# Patient Record
Sex: FEMALE | Race: WHITE | NOT HISPANIC OR LATINO | Employment: FULL TIME | URBAN - METROPOLITAN AREA
[De-identification: names, ages, dates, MRNs, and addresses within clinical notes are randomized per-mention and may not be internally consistent; named-entity substitution may affect disease eponyms.]

---

## 2023-03-09 ENCOUNTER — TELEPHONE (OUTPATIENT)
Dept: PRIMARY CARE | Facility: CLINIC | Age: 58
End: 2023-03-09

## 2023-03-09 DIAGNOSIS — F90.0 ATTENTION DEFICIT HYPERACTIVITY DISORDER (ADHD), PREDOMINANTLY INATTENTIVE TYPE: Primary | ICD-10-CM

## 2023-03-09 DIAGNOSIS — F41.9 ANXIETY: Primary | ICD-10-CM

## 2023-03-09 RX ORDER — LISDEXAMFETAMINE DIMESYLATE 30 MG/1
30 CAPSULE ORAL EVERY MORNING
Qty: 30 CAPSULE | Refills: 0 | Status: SHIPPED | OUTPATIENT
Start: 2023-03-09 | End: 2023-04-14 | Stop reason: SDUPTHER

## 2023-03-09 RX ORDER — ALPRAZOLAM 0.5 MG/1
0.5 TABLET ORAL 2 TIMES DAILY
COMMUNITY
End: 2023-03-09 | Stop reason: SDUPTHER

## 2023-03-09 RX ORDER — ALPRAZOLAM 0.5 MG/1
0.5 TABLET ORAL NIGHTLY PRN
Qty: 30 TABLET | Refills: 0 | Status: SHIPPED | OUTPATIENT
Start: 2023-03-09 | End: 2023-03-21 | Stop reason: SDUPTHER

## 2023-03-09 RX ORDER — CHLORHEXIDINE GLUCONATE ORAL RINSE 1.2 MG/ML
SOLUTION DENTAL
COMMUNITY
Start: 2022-11-02

## 2023-03-09 RX ORDER — ASPIRIN 325 MG
1 TABLET, DELAYED RELEASE (ENTERIC COATED) ORAL
COMMUNITY
Start: 2022-01-12 | End: 2024-03-06 | Stop reason: SDUPTHER

## 2023-03-10 PROBLEM — H90.11 CONDUCTIVE HEARING LOSS IN RIGHT EAR: Status: ACTIVE | Noted: 2023-03-10

## 2023-03-10 PROBLEM — B02.23 SHINGLES (HERPES ZOSTER) POLYNEUROPATHY: Status: ACTIVE | Noted: 2023-03-10

## 2023-03-10 PROBLEM — K21.9 GERD (GASTROESOPHAGEAL REFLUX DISEASE): Status: ACTIVE | Noted: 2023-03-10

## 2023-03-10 PROBLEM — F98.8 ADD (ATTENTION DEFICIT DISORDER): Status: ACTIVE | Noted: 2023-03-10

## 2023-03-10 PROBLEM — R25.2 MUSCLE CRAMPS: Status: ACTIVE | Noted: 2023-03-10

## 2023-03-10 PROBLEM — H92.01 OTALGIA OF RIGHT EAR: Status: ACTIVE | Noted: 2023-03-10

## 2023-03-10 PROBLEM — R07.81 RIB PAIN ON LEFT SIDE: Status: ACTIVE | Noted: 2023-03-10

## 2023-03-10 PROBLEM — H92.09 EARACHE: Status: ACTIVE | Noted: 2023-03-10

## 2023-03-10 PROBLEM — J45.909 ASTHMATIC BRONCHITIS (HHS-HCC): Status: ACTIVE | Noted: 2023-03-10

## 2023-03-10 PROBLEM — N39.0 URINARY TRACT INFECTION: Status: ACTIVE | Noted: 2023-03-10

## 2023-03-10 PROBLEM — F32.A ANXIETY AND DEPRESSION: Status: ACTIVE | Noted: 2023-03-10

## 2023-03-10 PROBLEM — M21.42 ACQUIRED PES PLANUS OF LEFT FOOT: Status: ACTIVE | Noted: 2023-03-10

## 2023-03-10 PROBLEM — R07.89 ATYPICAL CHEST PAIN: Status: ACTIVE | Noted: 2023-03-10

## 2023-03-10 PROBLEM — R09.82 POST-NASAL DRIP: Status: ACTIVE | Noted: 2023-03-10

## 2023-03-10 PROBLEM — M81.0 OSTEOPOROSIS: Status: ACTIVE | Noted: 2023-03-10

## 2023-03-10 PROBLEM — H90.71: Status: ACTIVE | Noted: 2023-03-10

## 2023-03-10 PROBLEM — E78.00 HYPERCHOLESTEROLEMIA: Status: ACTIVE | Noted: 2023-03-10

## 2023-03-10 PROBLEM — H90.11 CONDUCTIVE HEARING LOSS OF RIGHT EAR WITH UNRESTRICTED HEARING OF LEFT EAR: Status: ACTIVE | Noted: 2023-03-10

## 2023-03-10 PROBLEM — E78.5 DYSLIPIDEMIA: Status: ACTIVE | Noted: 2023-03-10

## 2023-03-10 PROBLEM — H90.71 MIXED CONDUCTIVE AND SENSORINEURAL HEARING LOSS OF RIGHT EAR WITH UNRESTRICTED HEARING OF LEFT EAR: Status: ACTIVE | Noted: 2023-03-10

## 2023-03-10 PROBLEM — E04.1 THYROID NODULE: Status: ACTIVE | Noted: 2023-03-10

## 2023-03-10 PROBLEM — R53.83 TIREDNESS: Status: ACTIVE | Noted: 2023-03-10

## 2023-03-10 PROBLEM — M79.7 MUSCLE PAIN, FIBROMYALGIA: Status: ACTIVE | Noted: 2023-03-10

## 2023-03-10 PROBLEM — F17.200 NICOTINE DEPENDENCE: Status: ACTIVE | Noted: 2023-03-10

## 2023-03-10 PROBLEM — H92.03 OTALGIA OF BOTH EARS: Status: ACTIVE | Noted: 2023-03-10

## 2023-03-10 PROBLEM — H90.2 HEARING LOSS, CONDUCTIVE: Status: ACTIVE | Noted: 2023-03-10

## 2023-03-10 PROBLEM — R06.02 SHORTNESS OF BREATH: Status: ACTIVE | Noted: 2023-03-10

## 2023-03-10 PROBLEM — R05.8 RECURRENT DRY COUGH: Status: ACTIVE | Noted: 2023-03-10

## 2023-03-10 PROBLEM — E04.2 MULTIPLE THYROID NODULES: Status: ACTIVE | Noted: 2023-03-10

## 2023-03-10 PROBLEM — M77.42 METATARSALGIA OF LEFT FOOT: Status: ACTIVE | Noted: 2023-03-10

## 2023-03-10 PROBLEM — M26.69 TMJ CREPITUS: Status: ACTIVE | Noted: 2023-03-10

## 2023-03-10 PROBLEM — B86 SCABIES INFESTATION: Status: ACTIVE | Noted: 2023-03-10

## 2023-03-10 PROBLEM — T78.40XA ALLERGY: Status: ACTIVE | Noted: 2023-03-10

## 2023-03-10 PROBLEM — R07.0 THROAT PAIN: Status: ACTIVE | Noted: 2023-03-10

## 2023-03-10 PROBLEM — R79.89 LOW TSH LEVEL: Status: ACTIVE | Noted: 2023-03-10

## 2023-03-10 PROBLEM — M25.471 EDEMA OF RIGHT ANKLE: Status: ACTIVE | Noted: 2023-03-10

## 2023-03-10 PROBLEM — J34.2 DEVIATED NASAL SEPTUM: Status: ACTIVE | Noted: 2023-03-10

## 2023-03-10 PROBLEM — R10.9 ABDOMINAL PAIN: Status: ACTIVE | Noted: 2023-03-10

## 2023-03-10 PROBLEM — J34.89 NASAL VESTIBULITIS: Status: ACTIVE | Noted: 2023-03-10

## 2023-03-10 PROBLEM — F41.9 ANXIETY AND DEPRESSION: Status: ACTIVE | Noted: 2023-03-10

## 2023-03-10 PROBLEM — E55.9 VITAMIN D DEFICIENCY: Status: ACTIVE | Noted: 2023-03-10

## 2023-03-10 PROBLEM — R05.3 PERSISTENT COUGH: Status: ACTIVE | Noted: 2023-03-10

## 2023-03-10 PROBLEM — A49.02 MRSA INFECTION (METHICILLIN-RESISTANT STAPHYLOCOCCUS AUREUS): Status: ACTIVE | Noted: 2023-03-10

## 2023-03-10 PROBLEM — R05.8: Status: ACTIVE | Noted: 2023-03-10

## 2023-03-10 PROBLEM — H02.60 XANTHELASMA: Status: ACTIVE | Noted: 2023-03-10

## 2023-03-10 PROBLEM — F11.20 OPIOID DEPENDENCE (MULTI): Status: ACTIVE | Noted: 2023-03-10

## 2023-03-10 PROBLEM — R05.9 COUGH: Status: ACTIVE | Noted: 2023-03-10

## 2023-03-10 PROBLEM — F41.8 ANTICIPATORY ANXIETY: Status: ACTIVE | Noted: 2023-03-10

## 2023-03-10 PROBLEM — R44.8 FACIAL PRESSURE: Status: ACTIVE | Noted: 2023-03-10

## 2023-03-10 PROBLEM — M54.50 LOW BACK PAIN: Status: ACTIVE | Noted: 2023-03-10

## 2023-03-10 PROBLEM — H73.10: Status: ACTIVE | Noted: 2023-03-10

## 2023-03-10 PROBLEM — F41.9 ANXIETY: Status: ACTIVE | Noted: 2023-03-10

## 2023-03-10 PROBLEM — L30.9 DERMATITIS: Status: ACTIVE | Noted: 2023-03-10

## 2023-03-10 PROBLEM — M84.375A METATARSAL STRESS FRACTURE OF LEFT FOOT: Status: ACTIVE | Noted: 2023-03-10

## 2023-03-10 PROBLEM — J30.2 SEASONAL ALLERGIES: Status: ACTIVE | Noted: 2023-03-10

## 2023-03-10 PROBLEM — R09.81 SINUS CONGESTION: Status: ACTIVE | Noted: 2023-03-10

## 2023-03-10 PROBLEM — H61.23 IMPACTED CERUMEN OF BOTH EARS: Status: ACTIVE | Noted: 2023-03-10

## 2023-03-10 PROBLEM — H61.21 IMPACTED CERUMEN OF RIGHT EAR: Status: ACTIVE | Noted: 2023-03-10

## 2023-03-10 PROBLEM — M84.375A STRESS FRACTURE OF TARSAL BONE OF LEFT FOOT: Status: ACTIVE | Noted: 2023-03-10

## 2023-03-10 PROBLEM — B34.9 VIRAL ILLNESS: Status: ACTIVE | Noted: 2023-03-10

## 2023-03-10 PROBLEM — R19.7 DIARRHEA: Status: ACTIVE | Noted: 2023-03-10

## 2023-03-10 PROBLEM — B37.31 VAGINAL YEAST INFECTION: Status: ACTIVE | Noted: 2023-03-10

## 2023-03-10 RX ORDER — EVOLOCUMAB 140 MG/ML
140 INJECTION, SOLUTION SUBCUTANEOUS
COMMUNITY

## 2023-03-10 RX ORDER — ALPRAZOLAM 0.5 MG/1
1 TABLET ORAL 2 TIMES DAILY
COMMUNITY
Start: 2022-09-08 | End: 2023-04-11 | Stop reason: ALTCHOICE

## 2023-03-10 RX ORDER — LISDEXAMFETAMINE DIMESYLATE CAPSULES 20 MG/1
1 CAPSULE ORAL DAILY
COMMUNITY
End: 2023-06-14 | Stop reason: ALTCHOICE

## 2023-03-10 RX ORDER — ALIROCUMAB 150 MG/ML
150 INJECTION, SOLUTION SUBCUTANEOUS
COMMUNITY
Start: 2020-03-19

## 2023-03-17 DIAGNOSIS — N63.32 MASS OF AXILLARY TAIL OF LEFT BREAST: Primary | ICD-10-CM

## 2023-03-17 PROBLEM — M60.9 STATIN-INDUCED MYOSITIS: Status: ACTIVE | Noted: 2023-03-17

## 2023-03-17 PROBLEM — M47.817 SPONDYLOSIS, LUMBOSACRAL: Status: ACTIVE | Noted: 2023-03-17

## 2023-03-17 PROBLEM — T46.6X5A STATIN-INDUCED MYOSITIS: Status: ACTIVE | Noted: 2023-03-17

## 2023-03-20 ENCOUNTER — APPOINTMENT (OUTPATIENT)
Dept: PRIMARY CARE | Facility: CLINIC | Age: 58
End: 2023-03-20

## 2023-03-21 ENCOUNTER — TELEPHONE (OUTPATIENT)
Dept: PRIMARY CARE | Facility: CLINIC | Age: 58
End: 2023-03-21

## 2023-03-21 DIAGNOSIS — F41.9 ANXIETY: ICD-10-CM

## 2023-03-21 RX ORDER — ALPRAZOLAM 0.5 MG/1
0.5 TABLET ORAL 2 TIMES DAILY
Qty: 30 TABLET | Refills: 0 | Status: SHIPPED | OUTPATIENT
Start: 2023-03-21 | End: 2023-04-11 | Stop reason: ALTCHOICE

## 2023-03-21 NOTE — TELEPHONE ENCOUNTER
Pt state her alprazolam was called into [facility] pharmacy wrong, she states she usually get alprazolam 0.5 take 1 tablet twice daily, she stae she is running out of her medications, please refill

## 2023-04-05 ENCOUNTER — TELEPHONE (OUTPATIENT)
Dept: PRIMARY CARE | Facility: CLINIC | Age: 58
End: 2023-04-05

## 2023-04-05 NOTE — TELEPHONE ENCOUNTER
----- Message from Sariah Sandoval PA-C sent at 4/4/2023  3:26 PM EDT -----  Please call her with mammogram results everything is normal.  No mass found.

## 2023-05-10 DIAGNOSIS — F41.9 ANXIETY: ICD-10-CM

## 2023-05-10 RX ORDER — ALPRAZOLAM 0.5 MG/1
0.5 TABLET ORAL 2 TIMES DAILY
Qty: 60 TABLET | Refills: 2 | Status: SHIPPED | OUTPATIENT
Start: 2023-05-10 | End: 2023-08-16 | Stop reason: SDUPTHER

## 2023-05-17 DIAGNOSIS — F90.0 ATTENTION DEFICIT HYPERACTIVITY DISORDER (ADHD), PREDOMINANTLY INATTENTIVE TYPE: ICD-10-CM

## 2023-05-17 RX ORDER — LISDEXAMFETAMINE DIMESYLATE 30 MG/1
30 CAPSULE ORAL EVERY MORNING
Qty: 30 CAPSULE | Refills: 0 | Status: SHIPPED | OUTPATIENT
Start: 2023-05-17 | End: 2023-06-14 | Stop reason: SDUPTHER

## 2023-06-14 DIAGNOSIS — F90.0 ATTENTION DEFICIT HYPERACTIVITY DISORDER (ADHD), PREDOMINANTLY INATTENTIVE TYPE: ICD-10-CM

## 2023-06-14 RX ORDER — LISDEXAMFETAMINE DIMESYLATE 30 MG/1
30 CAPSULE ORAL EVERY MORNING
Qty: 30 CAPSULE | Refills: 0 | Status: SHIPPED | OUTPATIENT
Start: 2023-06-14 | End: 2023-07-19 | Stop reason: SDUPTHER

## 2023-07-19 DIAGNOSIS — F90.0 ATTENTION DEFICIT HYPERACTIVITY DISORDER (ADHD), PREDOMINANTLY INATTENTIVE TYPE: ICD-10-CM

## 2023-07-19 RX ORDER — LISDEXAMFETAMINE DIMESYLATE 30 MG/1
30 CAPSULE ORAL EVERY MORNING
Qty: 30 CAPSULE | Refills: 0 | Status: SHIPPED | OUTPATIENT
Start: 2023-07-19 | End: 2023-08-23 | Stop reason: SDUPTHER

## 2023-08-11 DIAGNOSIS — F41.9 ANXIETY: ICD-10-CM

## 2023-08-15 NOTE — PROGRESS NOTES
Subjective   Donna Emery is a 58 y.o. female who presents for here for follow-up refills.  GABRIELA fontaine is 58 female with known history of severe anxiety on benzodiazepine doing well takes her medication as prescribed history of dyslipidemia chronic middle ear MRSA follows with ENT attention deficit disorder takes her medication prescribed was no major medical pain denies chest pain shortness of breath fever chills nausea vomiting constipation diarrhea dysuria urgency frequency.  Review of Systems  10 system review pertinent as above  Objective     There were no vitals taken for this visit.   Physical Exam  HEENT: Atraumatic normocephalic the pupils are equal and round and reactive to light the sclerae nonicteric extraocular motion are intact.  Neck: Is supple without JVD no carotid bruits the trachea is midline there are no masses pulses are equal and bilateral with normal upstroke.  Skin: Normal.  Skin good texture.  Moist.  Good turgor.  No lesions, no rashes.  Lymph: No lymphadenopathy appreciated, no masses, no lesions  Lungs: Are clear to auscultation and percussion, good breath sounds bilaterally, no rhonchi, no wheezing, good diaphragmatic excursion.  Heart: Normal rate and normal rhythm S1, S2, no S3, no gallop, murmur or rub.  Abdomen: Soft, nontender, no organomegaly, good bowel sounds.    Extremities: Full range of motion, good pulses bilateral.  No cyanosis, no clubbing or edema.  Neuro: Cranial nerves II-XII are grossly intact there is no sensory or motor deficits.  Able to move all extremities.      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 I have considered the risks of abuse, dependence, addiction and diversion    Is the patient prescribed a combination of a benzodiazepine and opioid?  No    Last Urine Drug Screen / ordered today: Yes  Recent Results (from the past 40046 hour(s))   Drug Screen, Urine With Reflex to Confirmation    Collection Time: 02/08/23 11:36 AM   Result Value Ref Range    DRUG SCREEN COMMENT URINE SEE BELOW     Amphetamine Screen, Urine PRESUMPTIVE POSITIVE (A) NEGATIVE    Barbiturate Screen, Urine PRESUMPTIVE NEGATIVE NEGATIVE    BENZODIAZEPINE (PRESENCE) IN URINE BY SCREEN METHOD PRESUMPTIVE POSITIVE (A) NEGATIVE    Cannabinoid Screen, Urine PRESUMPTIVE NEGATIVE NEGATIVE    Cocaine Screen, Urine PRESUMPTIVE NEGATIVE NEGATIVE    Fentanyl, Ur PRESUMPTIVE NEGATIVE NEGATIVE    Methadone Screen, Urine PRESUMPTIVE NEGATIVE NEGATIVE    Opiate Screen, Urine PRESUMPTIVE NEGATIVE NEGATIVE    Oxycodone Screen, Ur PRESUMPTIVE NEGATIVE NEGATIVE    PCP Screen, Urine PRESUMPTIVE NEGATIVE NEGATIVE   Benzodiazepine Confirmation, Urine    Collection Time: 02/08/23 11:36 AM   Result Value Ref Range    7-Aminoclonazepam <25 Cutoff <25 ng/mL    Alpha-Hydroxyalprazolam 424 (A) Cutoff <25 ng/mL    Alpha-Hydroxymidazolam <25 Cutoff <25 ng/mL    Alprazolam 90 (A) Cutoff <25 ng/mL    Chlordiazepoxide <25 Cutoff <25 ng/mL    Clonazepam <25 Cutoff <25 ng/mL    Diazepam <25 Cutoff <25 ng/mL    Lorazepam <25 Cutoff <25 ng/mL    Midazolam  <25 Cutoff <25 ng/mL    Nordiazepam <25 Cutoff <25 ng/mL    Oxazepam <25 Cutoff <25 ng/mL    Temazepam <25 Cutoff <25 ng/mL   Amphetamine Confirm, Urine    Collection Time: 02/08/23 11:36 AM   Result Value Ref Range    Amphetamines,Urine 3,774 ng/mL    MDA, Urine <200 ng/mL    MDEA, Urine <200 ng/mL    MDMA, Urine <200 ng/mL    Methamphetamine Quant, Ur <200 ng/mL    Phentermine,Urine <200 ng/mL   OPIATE/OPIOID/BENZO PRESCRIPTION COMPLIANCE    Collection Time: 01/12/22  1:13 PM   Result Value Ref Range    DRUG SCREEN COMMENT URINE SEE BELOW     Creatine, Urine 112.1 mg/dL    Amphetamine Screen, Urine PRESUMPTIVE POSITIVE (A) NEGATIVE    Barbiturate Screen, Urine PRESUMPTIVE NEGATIVE NEGATIVE    Cannabinoid Screen, Urine PRESUMPTIVE NEGATIVE NEGATIVE    Cocaine Screen, Urine PRESUMPTIVE NEGATIVE NEGATIVE    PCP Screen, Urine PRESUMPTIVE NEGATIVE NEGATIVE    7-Aminoclonazepam <25 Cutoff <25 ng/mL    Alpha-Hydroxyalprazolam 364 (A) Cutoff <25 ng/mL    Alpha-Hydroxymidazolam <25 Cutoff <25 ng/mL    Alprazolam 84 (A) Cutoff <25 ng/mL    Chlordiazepoxide <25 Cutoff <25 ng/mL    Clonazepam <25 Cutoff <25 ng/mL    Diazepam <25 Cutoff <25 ng/mL    Lorazepam <25 Cutoff <25 ng/mL    Midazolam <25 Cutoff <25 ng/mL    Nordiazepam <25 Cutoff <25 ng/mL    Oxazepam <25 Cutoff <25 ng/mL    Temazepam <25 Cutoff <25 ng/mL    Zolpidem <25 Cutoff <25 ng/mL    Zolpidem Metabolite (ZCA) <25 Cutoff <25 ng/mL    6-Acetylmorphine <25 Cutoff <25 ng/mL    Codeine <50 Cutoff <50 ng/mL    Hydrocodone <25 Cutoff <25 ng/mL    Hydromorphone <25 Cutoff <25 ng/mL    Morphine Urine <50 Cutoff <50 ng/mL    Norhydrocodone <25 Cutoff <25 ng/mL    Noroxycodone <25 Cutoff <25 ng/mL    Oxycodone <25 Cutoff <25 ng/mL    Oxymorphone <25 Cutoff <25 ng/mL    Tramadol <50 Cutoff <50 ng/mL    O-Desmethyltramadol <50 Cutoff <50 ng/mL    Fentanyl <2.5 Cutoff<2.5 ng/mL    Norfentanyl <2.5 Cutoff<2.5 ng/mL    METHADONE CONFIRMATION,URINE <25 Cutoff <25 ng/mL    EDDP  <25 Cutoff <25 ng/mL     Results are as expected.     Controlled Substance Agreement:  Date of the Last Agreement: August 17, 2023  Reviewed Controlled Substance Agreement including but not limited to the benefits, risks, and alternatives to treatment with a Controlled Substance medication(s).    Benzodiazepines:  What is the patient's goal of therapy?  Anxiety management  Is this being achieved with current treatment?  Yes    CHEKO-7:  No data recorded    Activities of Daily Living:   Is your overall impression that this patient is benefiting (symptom reduction outweighs side effects) from benzodiazepine therapy? Yes     1. Physical Functioning: Better  2. Family Relationship: Better  3. Social Relationship: Better  4. Mood: Better  5. Sleep Patterns: Better  6. Overall Function: Better and Stimulants:   What is the patient's goal of therapy?  ADD management  Is this being achieved with current treatment?  Yes    Activities of Daily Living:   Is your overall impression that this patient is benefiting (symptom reduction outweighs side effects) from stimulant therapy? Yes     1. Physical Functioning: Better  2. Family Relationship: Better  3. Social Relationship: Better  4. Mood: Better  5. Sleep Patterns: Better  6. Overall Function: Better      Neurosensory hearing loss  Follows with otology  Due to chronic middle ear infection        Problem List Items Addressed This Visit    None        Sravan New MD

## 2023-08-16 ENCOUNTER — OFFICE VISIT (OUTPATIENT)
Dept: PRIMARY CARE | Facility: CLINIC | Age: 58
End: 2023-08-16
Payer: COMMERCIAL

## 2023-08-16 VITALS — WEIGHT: 144 LBS | HEIGHT: 62 IN | BODY MASS INDEX: 26.5 KG/M2

## 2023-08-16 DIAGNOSIS — F41.9 ANXIETY: ICD-10-CM

## 2023-08-16 DIAGNOSIS — E78.5 DYSLIPIDEMIA: ICD-10-CM

## 2023-08-16 DIAGNOSIS — B35.3 TINEA PEDIS OF BOTH FEET: ICD-10-CM

## 2023-08-16 DIAGNOSIS — M60.9 STATIN-INDUCED MYOSITIS: Primary | ICD-10-CM

## 2023-08-16 DIAGNOSIS — T46.6X5A STATIN-INDUCED MYOSITIS: Primary | ICD-10-CM

## 2023-08-16 DIAGNOSIS — K21.9 GASTROESOPHAGEAL REFLUX DISEASE WITHOUT ESOPHAGITIS: ICD-10-CM

## 2023-08-16 PROBLEM — F17.200 NICOTINE DEPENDENCE: Status: RESOLVED | Noted: 2023-03-10 | Resolved: 2023-08-16

## 2023-08-16 LAB
AMPHETAMINE (PRESENCE) IN URINE BY SCREEN METHOD: ABNORMAL
BARBITURATES PRESENCE IN URINE BY SCREEN METHOD: ABNORMAL
BENZODIAZEPINE (PRESENCE) IN URINE BY SCREEN METHOD: ABNORMAL
CANNABINOIDS IN URINE BY SCREEN METHOD: ABNORMAL
COCAINE (PRESENCE) IN URINE BY SCREEN METHOD: ABNORMAL
DRUG SCREEN COMMENT URINE: ABNORMAL
FENTANYL URINE: ABNORMAL
METHADONE (PRESENCE) IN URINE BY SCREEN METHOD: ABNORMAL
OPIATES (PRESENCE) IN URINE BY SCREEN METHOD: ABNORMAL
OXYCODONE (PRESENCE) IN URINE BY SCREEN METHOD: ABNORMAL
PHENCYCLIDINE (PRESENCE) IN URINE BY SCREEN METHOD: ABNORMAL

## 2023-08-16 PROCEDURE — 99214 OFFICE O/P EST MOD 30 MIN: CPT | Performed by: INTERNAL MEDICINE

## 2023-08-16 PROCEDURE — 80307 DRUG TEST PRSMV CHEM ANLYZR: CPT

## 2023-08-16 PROCEDURE — 1036F TOBACCO NON-USER: CPT | Performed by: INTERNAL MEDICINE

## 2023-08-16 PROCEDURE — 80324 DRUG SCREEN AMPHETAMINES 1/2: CPT

## 2023-08-16 RX ORDER — EVOLOCUMAB 140 MG/ML
140 INJECTION, SOLUTION SUBCUTANEOUS
Qty: 6 ML | Refills: 3 | Status: SHIPPED | OUTPATIENT
Start: 2023-08-16 | End: 2024-08-15

## 2023-08-16 RX ORDER — KETOCONAZOLE 20 MG/G
CREAM TOPICAL 2 TIMES DAILY
Qty: 30 G | Refills: 0 | Status: SHIPPED | OUTPATIENT
Start: 2023-08-16 | End: 2024-08-15

## 2023-08-16 RX ORDER — NITROFURANTOIN 25; 75 MG/1; MG/1
CAPSULE ORAL
COMMUNITY
Start: 2023-04-26

## 2023-08-16 RX ORDER — ALPRAZOLAM 0.5 MG/1
0.5 TABLET ORAL 2 TIMES DAILY
Qty: 60 TABLET | Refills: 0 | Status: SHIPPED | OUTPATIENT
Start: 2023-08-16 | End: 2024-03-06 | Stop reason: ALTCHOICE

## 2023-08-16 RX ORDER — ALPRAZOLAM 0.5 MG/1
0.5 TABLET ORAL 2 TIMES DAILY
Qty: 60 TABLET | Refills: 2 | Status: SHIPPED | OUTPATIENT
Start: 2023-08-16 | End: 2023-11-29 | Stop reason: SDUPTHER

## 2023-08-16 ASSESSMENT — ENCOUNTER SYMPTOMS
LOSS OF SENSATION IN FEET: 0
DEPRESSION: 0
OCCASIONAL FEELINGS OF UNSTEADINESS: 0

## 2023-08-21 LAB
AMPHETAMINES,URINE: 2354 NG/ML
MDA,URINE: <200 NG/ML
MDEA,URINE: <200 NG/ML
MDMA,UR: <200 NG/ML
METHAMPHETAMINE QUANTITATIVE URINE: <200 NG/ML
PHENTERMINE,UR: <200 NG/ML

## 2023-08-23 DIAGNOSIS — F90.0 ATTENTION DEFICIT HYPERACTIVITY DISORDER (ADHD), PREDOMINANTLY INATTENTIVE TYPE: ICD-10-CM

## 2023-08-23 RX ORDER — LISDEXAMFETAMINE DIMESYLATE 30 MG/1
30 CAPSULE ORAL EVERY MORNING
Qty: 30 CAPSULE | Refills: 0 | Status: SHIPPED | OUTPATIENT
Start: 2023-08-23 | End: 2023-09-26 | Stop reason: SDUPTHER

## 2023-09-26 DIAGNOSIS — F90.0 ATTENTION DEFICIT HYPERACTIVITY DISORDER (ADHD), PREDOMINANTLY INATTENTIVE TYPE: ICD-10-CM

## 2023-09-26 RX ORDER — LISDEXAMFETAMINE DIMESYLATE 30 MG/1
30 CAPSULE ORAL EVERY MORNING
Qty: 30 CAPSULE | Refills: 0 | Status: SHIPPED | OUTPATIENT
Start: 2023-09-26 | End: 2023-10-25 | Stop reason: SDUPTHER

## 2023-10-05 ENCOUNTER — TELEMEDICINE (OUTPATIENT)
Dept: PRIMARY CARE | Facility: CLINIC | Age: 58
End: 2023-10-05
Payer: COMMERCIAL

## 2023-10-05 DIAGNOSIS — J01.00 ACUTE MAXILLARY SINUSITIS, RECURRENCE NOT SPECIFIED: ICD-10-CM

## 2023-10-05 DIAGNOSIS — J20.9 ACUTE BRONCHITIS, UNSPECIFIED ORGANISM: Primary | ICD-10-CM

## 2023-10-05 PROCEDURE — 99213 OFFICE O/P EST LOW 20 MIN: CPT | Performed by: INTERNAL MEDICINE

## 2023-10-05 RX ORDER — ESTRADIOL 10 UG/1
10 INSERT VAGINAL
COMMUNITY
Start: 2018-02-06

## 2023-10-05 RX ORDER — AZITHROMYCIN 250 MG/1
TABLET, FILM COATED ORAL
Qty: 6 TABLET | Refills: 0 | Status: SHIPPED | OUTPATIENT
Start: 2023-10-05 | End: 2023-10-10

## 2023-10-05 RX ORDER — ALBUTEROL SULFATE 90 UG/1
2 AEROSOL, METERED RESPIRATORY (INHALATION)
COMMUNITY

## 2023-10-05 NOTE — PROGRESS NOTES
Subjective   Donna Emery is a 58 y.o. female who presents for complaining of a cold, postnasal drip.  HPI  Donna is a 58 female with known history of dyslipidemia statin-induced myositis severe anxiety and ADD patient is complaining of feeling ill for the last 2 days, symptoms started about 2 days ago patient developed cough sinus congestion she tested negative for COVID twice on 10/5/2023 she took over-the-counter medication with little help.  She denies fever chills no loss of smell or loss of taste.  Review of Systems  10 systems reviewed with patient pertinent as above  Objective   Virtual visit  There were no vitals taken for this visit.   Physical Exam  Virtual visit      Assessment/Plan     Acute sinusitis  Tested negative for COVID on October 5, 2023  Suspect viral illness  Possible bacterial conversion  Will start on azithromycin as directed  Fluids and rest    Acute bronchitis  Secondary to acute sinusitis  Recommend rest  Fluids antibiotics  And work October 5 October 6  Return to work October 7  Problem List Items Addressed This Visit       Acute bronchitis - Primary    Relevant Medications    azithromycin (Zithromax) 250 mg tablet    Acute maxillary sinusitis         Sravan New MD

## 2023-10-25 DIAGNOSIS — F90.0 ATTENTION DEFICIT HYPERACTIVITY DISORDER (ADHD), PREDOMINANTLY INATTENTIVE TYPE: ICD-10-CM

## 2023-10-25 RX ORDER — LISDEXAMFETAMINE DIMESYLATE 30 MG/1
30 CAPSULE ORAL EVERY MORNING
Qty: 30 CAPSULE | Refills: 0 | Status: SHIPPED | OUTPATIENT
Start: 2023-10-25 | End: 2023-10-30

## 2023-10-28 DIAGNOSIS — F90.0 ATTENTION DEFICIT HYPERACTIVITY DISORDER (ADHD), PREDOMINANTLY INATTENTIVE TYPE: ICD-10-CM

## 2023-10-30 RX ORDER — LISDEXAMFETAMINE DIMESYLATE 30 MG/1
30 CAPSULE ORAL DAILY
Qty: 30 CAPSULE | Refills: 0 | Status: SHIPPED | OUTPATIENT
Start: 2023-10-30 | End: 2023-11-29 | Stop reason: SDUPTHER

## 2023-11-29 DIAGNOSIS — F90.0 ATTENTION DEFICIT HYPERACTIVITY DISORDER (ADHD), PREDOMINANTLY INATTENTIVE TYPE: ICD-10-CM

## 2023-11-29 DIAGNOSIS — F41.9 ANXIETY: ICD-10-CM

## 2023-11-29 RX ORDER — ALPRAZOLAM 0.5 MG/1
0.5 TABLET ORAL 2 TIMES DAILY
Qty: 60 TABLET | Refills: 2 | Status: SHIPPED | OUTPATIENT
Start: 2023-11-29 | End: 2024-03-06 | Stop reason: ALTCHOICE

## 2023-11-29 RX ORDER — LISDEXAMFETAMINE DIMESYLATE 30 MG/1
30 CAPSULE ORAL DAILY
Qty: 30 CAPSULE | Refills: 0 | Status: SHIPPED | OUTPATIENT
Start: 2023-11-29 | End: 2024-01-03 | Stop reason: SDUPTHER

## 2023-12-01 ENCOUNTER — LAB (OUTPATIENT)
Dept: LAB | Facility: LAB | Age: 58
End: 2023-12-01
Payer: COMMERCIAL

## 2023-12-01 DIAGNOSIS — E78.5 DYSLIPIDEMIA: ICD-10-CM

## 2023-12-01 DIAGNOSIS — K21.9 GASTROESOPHAGEAL REFLUX DISEASE WITHOUT ESOPHAGITIS: ICD-10-CM

## 2023-12-01 LAB
ANION GAP SERPL CALC-SCNC: 15 MMOL/L (ref 10–20)
BUN SERPL-MCNC: 12 MG/DL (ref 6–23)
CALCIUM SERPL-MCNC: 9.6 MG/DL (ref 8.6–10.6)
CHLORIDE SERPL-SCNC: 104 MMOL/L (ref 98–107)
CHOLEST SERPL-MCNC: 297 MG/DL (ref 0–199)
CHOLESTEROL/HDL RATIO: 4.8
CO2 SERPL-SCNC: 26 MMOL/L (ref 21–32)
CREAT SERPL-MCNC: 0.62 MG/DL (ref 0.5–1.05)
GFR SERPL CREATININE-BSD FRML MDRD: >90 ML/MIN/1.73M*2
GLUCOSE SERPL-MCNC: 80 MG/DL (ref 74–99)
HDLC SERPL-MCNC: 62.2 MG/DL
LDLC SERPL CALC-MCNC: 218 MG/DL
NON HDL CHOLESTEROL: 235 MG/DL (ref 0–149)
POTASSIUM SERPL-SCNC: 4.9 MMOL/L (ref 3.5–5.3)
SODIUM SERPL-SCNC: 140 MMOL/L (ref 136–145)
TRIGL SERPL-MCNC: 83 MG/DL (ref 0–149)
VLDL: 17 MG/DL (ref 0–40)

## 2023-12-01 PROCEDURE — 80061 LIPID PANEL: CPT

## 2023-12-01 PROCEDURE — 36415 COLL VENOUS BLD VENIPUNCTURE: CPT

## 2023-12-01 PROCEDURE — 80048 BASIC METABOLIC PNL TOTAL CA: CPT

## 2023-12-04 ENCOUNTER — TELEPHONE (OUTPATIENT)
Dept: PRIMARY CARE | Facility: CLINIC | Age: 58
End: 2023-12-04

## 2023-12-07 DIAGNOSIS — E78.5 DYSLIPIDEMIA: Primary | ICD-10-CM

## 2023-12-07 RX ORDER — EZETIMIBE 10 MG/1
10 TABLET ORAL DAILY
Qty: 30 TABLET | Refills: 11 | Status: SHIPPED | OUTPATIENT
Start: 2023-12-07 | End: 2024-12-06

## 2023-12-14 ENCOUNTER — TELEMEDICINE (OUTPATIENT)
Dept: PRIMARY CARE | Facility: CLINIC | Age: 58
End: 2023-12-14
Payer: COMMERCIAL

## 2023-12-14 DIAGNOSIS — E78.5 DYSLIPIDEMIA: Primary | ICD-10-CM

## 2023-12-14 PROCEDURE — 99213 OFFICE O/P EST LOW 20 MIN: CPT | Performed by: INTERNAL MEDICINE

## 2023-12-14 RX ORDER — EZETIMIBE 10 MG/1
10 TABLET ORAL DAILY
Qty: 30 TABLET | Refills: 11 | Status: SHIPPED | OUTPATIENT
Start: 2023-12-14 | End: 2024-12-13

## 2023-12-14 NOTE — PROGRESS NOTES
Subjective   Donna Emery is a 58 y.o. female who presents for here for a virtual visit refills.  GABRIELA fontaine is 58 female with known history of severe anxiety on benzodiazepine doing well takes her medication as prescribed history of dyslipidemia chronic middle ear MRSA follows with ENT attention deficit disorder takes her medication prescribed patient voices no major complaints, she denies fever chills cough nausea vomiting.  She is here for face-to-face needs refills of benzodiazepine.  Review of Systems  10 system review pertinent as above  Objective   Virtual visit  There were no vitals taken for this visit.   Physical Exam  Virtual visit  Assessment/Plan     Virtual visit  On benzodiazepines  For severe anxiety and pain  Takes medication as prescribed    Attention deficit disorder  On Vyvanse 30 mg daily  As needed   Takes almost daily  Holds a full-time job  Able to complete tasks more sensibly and organized      Severe dyslipidemia  Continue with the low-fat, low-cholesterol diet,  I recommended Mediterranean diet, which include fish, chicken, vegetables and olive oil  Exercise daily for 30 minutes at least 3 times a week  Continue home medications  Does not take her PCSK9 every 2 weeks  I have instructed patient to take her Repatha every 2 weeks  Taking Zetia 10 mg daily  Patient does not take medications as prescribed  She is very high risk for coronary artery disease  Patient with dyslipidemia elevated LDL  Last cardiac calcium score was 42.3 in July 19, 2019  Will repeat next year 2024    Statin induced myositis  Discontinued on Repatha  And ezetimibe      OARRS:  No data recorded  I have personally reviewed the OARRS report for Donna Emery. I have considered the risks of abuse, dependence, addiction and diversion    Is the patient prescribed a combination of a benzodiazepine and opioid?  No    Last Urine Drug Screen / ordered today: Yes  Recent Results (from the past 8760 hour(s))    Amphetamine Confirm, Urine    Collection Time: 08/16/23 11:16 AM   Result Value Ref Range    Methamphetamine Quant, Ur <200 ng/mL    MDA, Urine <200 ng/mL    MDEA, Urine <200 ng/mL    Phentermine,Urine <200 ng/mL    Amphetamines,Urine 2354 ng/mL    MDMA, Urine <200 ng/mL   Drug Screen, Urine With Reflex to Confirmation    Collection Time: 08/16/23 11:16 AM   Result Value Ref Range    DRUG SCREEN COMMENT URINE SEE BELOW     Amphetamine Screen, Urine PRESUMPTIVE POSITIVE (A) NEGATIVE    Barbiturate Screen, Urine PRESUMPTIVE NEGATIVE NEGATIVE    BENZODIAZEPINE (PRESENCE) IN URINE BY SCREEN METHOD PRESUMPTIVE NEGATIVE NEGATIVE    Cannabinoid Screen, Urine PRESUMPTIVE NEGATIVE NEGATIVE    Cocaine Screen, Urine PRESUMPTIVE NEGATIVE NEGATIVE    Fentanyl, Ur PRESUMPTIVE NEGATIVE NEGATIVE    Methadone Screen, Urine PRESUMPTIVE NEGATIVE NEGATIVE    Opiate Screen, Urine PRESUMPTIVE NEGATIVE NEGATIVE    Oxycodone Screen, Ur PRESUMPTIVE NEGATIVE NEGATIVE    PCP Screen, Urine PRESUMPTIVE NEGATIVE NEGATIVE   Benzodiazepine Confirmation, Urine    Collection Time: 02/08/23 11:36 AM   Result Value Ref Range    7-Aminoclonazepam <25 Cutoff <25 ng/mL    Alpha-Hydroxyalprazolam 424 (A) Cutoff <25 ng/mL    Alpha-Hydroxymidazolam <25 Cutoff <25 ng/mL    Alprazolam 90 (A) Cutoff <25 ng/mL    Chlordiazepoxide <25 Cutoff <25 ng/mL    Clonazepam <25 Cutoff <25 ng/mL    Diazepam <25 Cutoff <25 ng/mL    Lorazepam <25 Cutoff <25 ng/mL    Midazolam <25 Cutoff <25 ng/mL    Nordiazepam <25 Cutoff <25 ng/mL    Oxazepam <25 Cutoff <25 ng/mL    Temazepam <25 Cutoff <25 ng/mL     Results are as expected.     Controlled Substance Agreement:  Date of the Last Agreement: August 17, 2023  Reviewed Controlled Substance Agreement including but not limited to the benefits, risks, and alternatives to treatment with a Controlled Substance medication(s).    Benzodiazepines:  What is the patient's goal of therapy?  Anxiety management  Is this being achieved  with current treatment?  Yes    CHEKO-7:  No data recorded    Activities of Daily Living:   Is your overall impression that this patient is benefiting (symptom reduction outweighs side effects) from benzodiazepine therapy? Yes     1. Physical Functioning: Better  2. Family Relationship: Better  3. Social Relationship: Better  4. Mood: Better  5. Sleep Patterns: Better  6. Overall Function: Better and Stimulants:   What is the patient's goal of therapy?  ADD management  Is this being achieved with current treatment?  Yes    Activities of Daily Living:   Is your overall impression that this patient is benefiting (symptom reduction outweighs side effects) from stimulant therapy? Yes     1. Physical Functioning: Better  2. Family Relationship: Better  3. Social Relationship: Better  4. Mood: Better  5. Sleep Patterns: Better  6. Overall Function: Better      Neurosensory hearing loss  Follows with otology  Due to chronic middle ear infection        Problem List Items Addressed This Visit       Dyslipidemia - Primary    Relevant Medications    ezetimibe (Zetia) 10 mg tablet           Sravan New MD

## 2023-12-21 ENCOUNTER — DOCUMENTATION (OUTPATIENT)
Dept: PHYSICAL THERAPY | Facility: CLINIC | Age: 58
End: 2023-12-21

## 2023-12-21 ENCOUNTER — TELEMEDICINE (OUTPATIENT)
Dept: PRIMARY CARE | Facility: CLINIC | Age: 58
End: 2023-12-21
Payer: COMMERCIAL

## 2023-12-21 DIAGNOSIS — E78.5 DYSLIPIDEMIA: ICD-10-CM

## 2023-12-21 DIAGNOSIS — Z00.00 PHYSICAL EXAM: ICD-10-CM

## 2023-12-21 DIAGNOSIS — E55.9 VITAMIN D INSUFFICIENCY: ICD-10-CM

## 2023-12-21 DIAGNOSIS — J20.9 ACUTE BRONCHITIS, UNSPECIFIED ORGANISM: Primary | ICD-10-CM

## 2023-12-21 PROCEDURE — 99214 OFFICE O/P EST MOD 30 MIN: CPT | Performed by: INTERNAL MEDICINE

## 2023-12-21 RX ORDER — AZITHROMYCIN 250 MG/1
TABLET, FILM COATED ORAL
Qty: 6 TABLET | Refills: 0 | Status: SHIPPED | OUTPATIENT
Start: 2023-12-21 | End: 2023-12-26

## 2024-01-03 DIAGNOSIS — F90.0 ATTENTION DEFICIT HYPERACTIVITY DISORDER (ADHD), PREDOMINANTLY INATTENTIVE TYPE: ICD-10-CM

## 2024-01-03 RX ORDER — LISDEXAMFETAMINE DIMESYLATE 30 MG/1
30 CAPSULE ORAL DAILY
Qty: 30 CAPSULE | Refills: 0 | Status: SHIPPED | OUTPATIENT
Start: 2024-01-03 | End: 2024-02-02 | Stop reason: SDUPTHER

## 2024-02-02 DIAGNOSIS — F90.0 ATTENTION DEFICIT HYPERACTIVITY DISORDER (ADHD), PREDOMINANTLY INATTENTIVE TYPE: ICD-10-CM

## 2024-02-03 RX ORDER — LISDEXAMFETAMINE DIMESYLATE 30 MG/1
30 CAPSULE ORAL DAILY
Qty: 30 CAPSULE | Refills: 0 | Status: SHIPPED | OUTPATIENT
Start: 2024-02-03 | End: 2024-03-06 | Stop reason: SDUPTHER

## 2024-02-29 ENCOUNTER — LAB (OUTPATIENT)
Dept: LAB | Facility: LAB | Age: 59
End: 2024-02-29
Payer: COMMERCIAL

## 2024-02-29 DIAGNOSIS — E55.9 VITAMIN D INSUFFICIENCY: ICD-10-CM

## 2024-02-29 DIAGNOSIS — E78.5 DYSLIPIDEMIA: ICD-10-CM

## 2024-02-29 DIAGNOSIS — Z00.00 PHYSICAL EXAM: ICD-10-CM

## 2024-02-29 LAB
25(OH)D3 SERPL-MCNC: 30 NG/ML (ref 30–100)
ANION GAP SERPL CALC-SCNC: 11 MMOL/L (ref 10–20)
BUN SERPL-MCNC: 9 MG/DL (ref 6–23)
CALCIUM SERPL-MCNC: 9.2 MG/DL (ref 8.6–10.6)
CHLORIDE SERPL-SCNC: 104 MMOL/L (ref 98–107)
CHOLEST SERPL-MCNC: 191 MG/DL (ref 0–199)
CHOLESTEROL/HDL RATIO: 3.3
CO2 SERPL-SCNC: 30 MMOL/L (ref 21–32)
CREAT SERPL-MCNC: 0.61 MG/DL (ref 0.5–1.05)
EGFRCR SERPLBLD CKD-EPI 2021: >90 ML/MIN/1.73M*2
ERYTHROCYTE [DISTWIDTH] IN BLOOD BY AUTOMATED COUNT: 13.2 % (ref 11.5–14.5)
FOLATE SERPL-MCNC: 11.7 NG/ML
GLUCOSE SERPL-MCNC: 111 MG/DL (ref 74–99)
HCT VFR BLD AUTO: 39.8 % (ref 36–46)
HDLC SERPL-MCNC: 57.5 MG/DL
HGB BLD-MCNC: 12.8 G/DL (ref 12–16)
LDLC SERPL CALC-MCNC: 104 MG/DL
MCH RBC QN AUTO: 27.9 PG (ref 26–34)
MCHC RBC AUTO-ENTMCNC: 32.2 G/DL (ref 32–36)
MCV RBC AUTO: 87 FL (ref 80–100)
NON HDL CHOLESTEROL: 134 MG/DL (ref 0–149)
NRBC BLD-RTO: 0 /100 WBCS (ref 0–0)
PLATELET # BLD AUTO: 320 X10*3/UL (ref 150–450)
POTASSIUM SERPL-SCNC: 4 MMOL/L (ref 3.5–5.3)
RBC # BLD AUTO: 4.58 X10*6/UL (ref 4–5.2)
SODIUM SERPL-SCNC: 141 MMOL/L (ref 136–145)
TRIGL SERPL-MCNC: 147 MG/DL (ref 0–149)
VIT B12 SERPL-MCNC: 253 PG/ML (ref 211–911)
VLDL: 29 MG/DL (ref 0–40)
WBC # BLD AUTO: 4.6 X10*3/UL (ref 4.4–11.3)

## 2024-02-29 PROCEDURE — 80061 LIPID PANEL: CPT

## 2024-02-29 PROCEDURE — 80048 BASIC METABOLIC PNL TOTAL CA: CPT

## 2024-02-29 PROCEDURE — 36415 COLL VENOUS BLD VENIPUNCTURE: CPT

## 2024-02-29 PROCEDURE — 82607 VITAMIN B-12: CPT

## 2024-02-29 PROCEDURE — 82525 ASSAY OF COPPER: CPT

## 2024-02-29 PROCEDURE — 82746 ASSAY OF FOLIC ACID SERUM: CPT

## 2024-02-29 PROCEDURE — 82306 VITAMIN D 25 HYDROXY: CPT

## 2024-02-29 PROCEDURE — 85027 COMPLETE CBC AUTOMATED: CPT

## 2024-03-02 LAB — COPPER SERPL-MCNC: 119.5 UG/DL (ref 80–155)

## 2024-03-06 ENCOUNTER — TELEMEDICINE (OUTPATIENT)
Dept: PRIMARY CARE | Facility: CLINIC | Age: 59
End: 2024-03-06
Payer: COMMERCIAL

## 2024-03-06 DIAGNOSIS — F41.9 ANXIETY: ICD-10-CM

## 2024-03-06 DIAGNOSIS — E53.8 B12 DEFICIENCY: ICD-10-CM

## 2024-03-06 DIAGNOSIS — E55.9 VITAMIN D INSUFFICIENCY: ICD-10-CM

## 2024-03-06 DIAGNOSIS — R10.84 GENERALIZED ABDOMINAL PAIN: Primary | ICD-10-CM

## 2024-03-06 DIAGNOSIS — F90.0 ATTENTION DEFICIT HYPERACTIVITY DISORDER (ADHD), PREDOMINANTLY INATTENTIVE TYPE: ICD-10-CM

## 2024-03-06 DIAGNOSIS — Z12.11 COLON CANCER SCREENING: ICD-10-CM

## 2024-03-06 PROCEDURE — 1036F TOBACCO NON-USER: CPT | Performed by: INTERNAL MEDICINE

## 2024-03-06 PROCEDURE — 99214 OFFICE O/P EST MOD 30 MIN: CPT | Performed by: INTERNAL MEDICINE

## 2024-03-06 RX ORDER — LISDEXAMFETAMINE DIMESYLATE 30 MG/1
30 CAPSULE ORAL DAILY
Qty: 30 CAPSULE | Refills: 0 | Status: SHIPPED | OUTPATIENT
Start: 2024-03-06 | End: 2024-04-05 | Stop reason: SDUPTHER

## 2024-03-06 RX ORDER — CYANOCOBALAMIN 1000 UG/ML
1000 INJECTION, SOLUTION INTRAMUSCULAR; SUBCUTANEOUS
Status: DISCONTINUED | OUTPATIENT
Start: 2024-03-12 | End: 2024-03-12

## 2024-03-06 RX ORDER — ASPIRIN 325 MG
50000 TABLET, DELAYED RELEASE (ENTERIC COATED) ORAL
Qty: 4 CAPSULE | Refills: 3 | Status: SHIPPED | OUTPATIENT
Start: 2024-03-06 | End: 2025-03-06

## 2024-03-06 RX ORDER — ALPRAZOLAM 0.5 MG/1
0.5 TABLET ORAL 2 TIMES DAILY
Qty: 60 TABLET | Refills: 0 | Status: SHIPPED | OUTPATIENT
Start: 2024-03-06 | End: 2024-04-08 | Stop reason: SDUPTHER

## 2024-03-06 ASSESSMENT — ANXIETY QUESTIONNAIRES
GAD7 TOTAL SCORE: 16
5. BEING SO RESTLESS THAT IT IS HARD TO SIT STILL: NEARLY EVERY DAY
2. NOT BEING ABLE TO STOP OR CONTROL WORRYING: MORE THAN HALF THE DAYS
7. FEELING AFRAID AS IF SOMETHING AWFUL MIGHT HAPPEN: MORE THAN HALF THE DAYS
3. WORRYING TOO MUCH ABOUT DIFFERENT THINGS: NEARLY EVERY DAY
1. FEELING NERVOUS, ANXIOUS, OR ON EDGE: MORE THAN HALF THE DAYS
4. TROUBLE RELAXING: NEARLY EVERY DAY
IF YOU CHECKED OFF ANY PROBLEMS ON THIS QUESTIONNAIRE, HOW DIFFICULT HAVE THESE PROBLEMS MADE IT FOR YOU TO DO YOUR WORK, TAKE CARE OF THINGS AT HOME, OR GET ALONG WITH OTHER PEOPLE: NOT DIFFICULT AT ALL
6. BECOMING EASILY ANNOYED OR IRRITABLE: SEVERAL DAYS

## 2024-03-12 ENCOUNTER — OFFICE VISIT (OUTPATIENT)
Dept: PRIMARY CARE | Facility: CLINIC | Age: 59
End: 2024-03-12
Payer: COMMERCIAL

## 2024-03-12 DIAGNOSIS — Z79.899 MEDICATION MANAGEMENT: Primary | ICD-10-CM

## 2024-03-12 DIAGNOSIS — E53.8 B12 DEFICIENCY: Primary | ICD-10-CM

## 2024-03-12 DIAGNOSIS — F90.0 ATTENTION DEFICIT HYPERACTIVITY DISORDER (ADHD), PREDOMINANTLY INATTENTIVE TYPE: ICD-10-CM

## 2024-03-12 DIAGNOSIS — F41.9 ANXIETY: ICD-10-CM

## 2024-03-12 LAB
AMPHETAMINES UR QL SCN: ABNORMAL
BARBITURATES UR QL SCN: ABNORMAL
BENZODIAZ UR QL SCN: ABNORMAL
BZE UR QL SCN: ABNORMAL
CANNABINOIDS UR QL SCN: ABNORMAL
FENTANYL+NORFENTANYL UR QL SCN: ABNORMAL
METHADONE UR QL SCN: ABNORMAL
OPIATES UR QL SCN: ABNORMAL
OXYCODONE+OXYMORPHONE UR QL SCN: ABNORMAL
PCP UR QL SCN: ABNORMAL

## 2024-03-12 PROCEDURE — 80361 OPIATES 1 OR MORE: CPT

## 2024-03-12 PROCEDURE — 80365 DRUG SCREENING OXYCODONE: CPT

## 2024-03-12 PROCEDURE — 96372 THER/PROPH/DIAG INJ SC/IM: CPT | Performed by: INTERNAL MEDICINE

## 2024-03-12 PROCEDURE — 80324 DRUG SCREEN AMPHETAMINES 1/2: CPT

## 2024-03-12 PROCEDURE — 1036F TOBACCO NON-USER: CPT | Performed by: INTERNAL MEDICINE

## 2024-03-12 PROCEDURE — 80346 BENZODIAZEPINES1-12: CPT

## 2024-03-12 PROCEDURE — 99024 POSTOP FOLLOW-UP VISIT: CPT | Performed by: INTERNAL MEDICINE

## 2024-03-12 PROCEDURE — 80307 DRUG TEST PRSMV CHEM ANLYZR: CPT

## 2024-03-12 RX ORDER — CYANOCOBALAMIN 1000 UG/ML
1000 INJECTION, SOLUTION INTRAMUSCULAR; SUBCUTANEOUS ONCE
Status: COMPLETED | OUTPATIENT
Start: 2024-03-12 | End: 2024-03-12

## 2024-03-12 RX ADMIN — CYANOCOBALAMIN 1000 MCG: 1000 INJECTION, SOLUTION INTRAMUSCULAR; SUBCUTANEOUS at 10:30

## 2024-03-14 LAB
1OH-MIDAZOLAM UR CFM-MCNC: <25 NG/ML
6MAM UR CFM-MCNC: <25 NG/ML
7AMINOCLONAZEPAM UR CFM-MCNC: <25 NG/ML
A-OH ALPRAZ UR CFM-MCNC: 427 NG/ML
ALPRAZ UR CFM-MCNC: 94 NG/ML
CHLORDIAZEP UR CFM-MCNC: <25 NG/ML
CLONAZEPAM UR CFM-MCNC: <25 NG/ML
CODEINE UR CFM-MCNC: <50 NG/ML
DIAZEPAM UR CFM-MCNC: <25 NG/ML
HYDROCODONE CTO UR CFM-MCNC: <25 NG/ML
HYDROMORPHONE UR CFM-MCNC: <25 NG/ML
LORAZEPAM UR CFM-MCNC: <25 NG/ML
MIDAZOLAM UR CFM-MCNC: <25 NG/ML
MORPHINE UR CFM-MCNC: 370 NG/ML
NORDIAZEPAM UR CFM-MCNC: <25 NG/ML
NORHYDROCODONE UR CFM-MCNC: <25 NG/ML
NOROXYCODONE UR CFM-MCNC: <25 NG/ML
OXAZEPAM UR CFM-MCNC: <25 NG/ML
OXYCODONE UR CFM-MCNC: <25 NG/ML
OXYMORPHONE UR CFM-MCNC: <25 NG/ML
TEMAZEPAM UR CFM-MCNC: <25 NG/ML

## 2024-03-16 LAB
AMPHET UR-MCNC: >5000 NG/ML
MDA UR-MCNC: <200 NG/ML
MDEA UR-MCNC: <200 NG/ML
MDMA UR-MCNC: <200 NG/ML
METHAMPHET UR-MCNC: <200 NG/ML
PHENTERMINE UR CFM-MCNC: <200 NG/ML

## 2024-03-17 LAB — NONINV COLON CA DNA+OCC BLD SCRN STL QL: NEGATIVE

## 2024-03-19 ENCOUNTER — OFFICE VISIT (OUTPATIENT)
Dept: PRIMARY CARE | Facility: CLINIC | Age: 59
End: 2024-03-19
Payer: COMMERCIAL

## 2024-03-19 DIAGNOSIS — Z79.899 MEDICATION MANAGEMENT: ICD-10-CM

## 2024-03-19 PROCEDURE — 80346 BENZODIAZEPINES1-12: CPT

## 2024-03-19 PROCEDURE — 80307 DRUG TEST PRSMV CHEM ANLYZR: CPT

## 2024-03-19 PROCEDURE — 1036F TOBACCO NON-USER: CPT | Performed by: INTERNAL MEDICINE

## 2024-03-19 PROCEDURE — 80324 DRUG SCREEN AMPHETAMINES 1/2: CPT

## 2024-03-19 PROCEDURE — 99024 POSTOP FOLLOW-UP VISIT: CPT | Performed by: INTERNAL MEDICINE

## 2024-03-21 ENCOUNTER — HOSPITAL ENCOUNTER (OUTPATIENT)
Dept: RADIOLOGY | Facility: CLINIC | Age: 59
End: 2024-03-21
Payer: COMMERCIAL

## 2024-03-22 LAB
1OH-MIDAZOLAM UR CFM-MCNC: <25 NG/ML
7AMINOCLONAZEPAM UR CFM-MCNC: <25 NG/ML
A-OH ALPRAZ UR CFM-MCNC: 489 NG/ML
ALPRAZ UR CFM-MCNC: 79 NG/ML
CHLORDIAZEP UR CFM-MCNC: <25 NG/ML
CLONAZEPAM UR CFM-MCNC: <25 NG/ML
DIAZEPAM UR CFM-MCNC: <25 NG/ML
LORAZEPAM UR CFM-MCNC: <25 NG/ML
MIDAZOLAM UR CFM-MCNC: <25 NG/ML
NORDIAZEPAM UR CFM-MCNC: <25 NG/ML
OXAZEPAM UR CFM-MCNC: <25 NG/ML
TEMAZEPAM UR CFM-MCNC: <25 NG/ML

## 2024-04-05 DIAGNOSIS — F90.0 ATTENTION DEFICIT HYPERACTIVITY DISORDER (ADHD), PREDOMINANTLY INATTENTIVE TYPE: ICD-10-CM

## 2024-04-05 RX ORDER — LISDEXAMFETAMINE DIMESYLATE 30 MG/1
30 CAPSULE ORAL DAILY
Qty: 30 CAPSULE | Refills: 0 | Status: SHIPPED | OUTPATIENT
Start: 2024-04-05 | End: 2024-05-06 | Stop reason: SDUPTHER

## 2024-04-08 DIAGNOSIS — F41.9 ANXIETY: ICD-10-CM

## 2024-04-08 RX ORDER — ALPRAZOLAM 0.5 MG/1
0.5 TABLET ORAL 2 TIMES DAILY
Qty: 60 TABLET | Refills: 0 | Status: SHIPPED | OUTPATIENT
Start: 2024-04-08 | End: 2024-05-14 | Stop reason: SDUPTHER

## 2024-05-06 DIAGNOSIS — F90.0 ATTENTION DEFICIT HYPERACTIVITY DISORDER (ADHD), PREDOMINANTLY INATTENTIVE TYPE: ICD-10-CM

## 2024-05-06 RX ORDER — LISDEXAMFETAMINE DIMESYLATE 30 MG/1
30 CAPSULE ORAL DAILY
Qty: 30 CAPSULE | Refills: 0 | Status: SHIPPED | OUTPATIENT
Start: 2024-05-06 | End: 2024-05-07 | Stop reason: SDUPTHER

## 2024-05-07 DIAGNOSIS — F90.0 ATTENTION DEFICIT HYPERACTIVITY DISORDER (ADHD), PREDOMINANTLY INATTENTIVE TYPE: ICD-10-CM

## 2024-05-07 RX ORDER — LISDEXAMFETAMINE DIMESYLATE 30 MG/1
30 CAPSULE ORAL DAILY
Qty: 30 CAPSULE | Refills: 0 | Status: SHIPPED | OUTPATIENT
Start: 2024-05-07 | End: 2024-05-31 | Stop reason: SDUPTHER

## 2024-05-14 DIAGNOSIS — F41.9 ANXIETY: ICD-10-CM

## 2024-05-14 RX ORDER — ALPRAZOLAM 0.5 MG/1
0.5 TABLET ORAL 2 TIMES DAILY
Qty: 60 TABLET | Refills: 0 | Status: SHIPPED | OUTPATIENT
Start: 2024-05-14 | End: 2024-06-10 | Stop reason: SDUPTHER

## 2024-05-31 DIAGNOSIS — F90.0 ATTENTION DEFICIT HYPERACTIVITY DISORDER (ADHD), PREDOMINANTLY INATTENTIVE TYPE: ICD-10-CM

## 2024-05-31 RX ORDER — LISDEXAMFETAMINE DIMESYLATE 30 MG/1
30 CAPSULE ORAL DAILY
Qty: 30 CAPSULE | Refills: 0 | Status: SHIPPED | OUTPATIENT
Start: 2024-05-31

## 2024-06-10 DIAGNOSIS — F41.9 ANXIETY: ICD-10-CM

## 2024-06-10 RX ORDER — ALPRAZOLAM 0.5 MG/1
0.5 TABLET ORAL 2 TIMES DAILY
Qty: 60 TABLET | Refills: 0 | Status: SHIPPED | OUTPATIENT
Start: 2024-06-10

## 2024-06-27 DIAGNOSIS — F90.0 ATTENTION DEFICIT HYPERACTIVITY DISORDER (ADHD), PREDOMINANTLY INATTENTIVE TYPE: ICD-10-CM

## 2024-06-27 RX ORDER — LISDEXAMFETAMINE DIMESYLATE 30 MG/1
30 CAPSULE ORAL DAILY
Qty: 30 CAPSULE | Refills: 0 | Status: SHIPPED | OUTPATIENT
Start: 2024-06-27

## 2024-07-03 ENCOUNTER — TELEPHONE (OUTPATIENT)
Dept: PRIMARY CARE | Facility: CLINIC | Age: 59
End: 2024-07-03
Payer: COMMERCIAL

## 2024-07-03 DIAGNOSIS — M60.9 STATIN-INDUCED MYOSITIS: ICD-10-CM

## 2024-07-03 DIAGNOSIS — T46.6X5A STATIN-INDUCED MYOSITIS: ICD-10-CM

## 2024-07-03 DIAGNOSIS — R93.1 HIGH CORONARY ARTERY CALCIUM SCORE: Primary | ICD-10-CM

## 2024-07-03 DIAGNOSIS — E78.5 DYSLIPIDEMIA: ICD-10-CM

## 2024-07-03 DIAGNOSIS — F41.9 ANXIETY: ICD-10-CM

## 2024-07-03 DIAGNOSIS — N30.00 ACUTE CYSTITIS WITHOUT HEMATURIA: Primary | ICD-10-CM

## 2024-07-03 RX ORDER — NITROFURANTOIN 25; 75 MG/1; MG/1
100 CAPSULE ORAL EVERY 12 HOURS SCHEDULED
Qty: 30 CAPSULE | Refills: 1 | Status: SHIPPED | OUTPATIENT
Start: 2024-07-03

## 2024-07-12 RX ORDER — ALPRAZOLAM 0.5 MG/1
0.5 TABLET ORAL 2 TIMES DAILY
Qty: 60 TABLET | Refills: 0 | Status: SHIPPED | OUTPATIENT
Start: 2024-07-12

## 2024-07-16 RX ORDER — EVOLOCUMAB 140 MG/ML
140 INJECTION, SOLUTION SUBCUTANEOUS
Qty: 6 ML | Refills: 3 | Status: SHIPPED | OUTPATIENT
Start: 2024-07-16 | End: 2025-07-16

## 2024-07-17 NOTE — PROGRESS NOTES
Subjective   Donna Emery is a 59 y.o. female who presents for a virtual visit sore throat sinus congestion.    HPI  Donna is 59 with a history of ADD low B12 familial hyperlipidemia severe dyslipidemia on multiple medications including Repatha ezetimibe hx statin induced myositis.  Patient complaining of sinus congestion and a sore throat post nasal drip and generalized malaise, she denies fever chills no loss of smell no loss of taste she did not check for COVID.  She takes her medication as prescribed including Vyvanse and Xanax for ADD and anxiety respectively.  She feels a little bit dizzy and a slight headache, nasal discharge is clear with yellowish.  Review of Systems  10 system review pertinent as above  Objective   Virtual  There were no vitals taken for this visit.   Physical Exam  Virtual    Assessment/Plan     Virtual visit  Acute sinusitis  Postnasal drip  Sinus congestion    Sore throat  In the setting of postnasal drip  And sinus congestion  I suspect bacterial sinusitis  Will start Augmentin 50 milligrams times a day with food    I recommended she check for COVID  Patient will do and keep me posted      In addition patient will be coming in for physical exam  In January 2024  Appropriate blood works were entered into the record  Will go to the lab to have blood works    Low-fat, low-cholesterol diet.  I recommended Mediterranean diet  Including fish, chicken, vegetables and olive oil  Exercise daily for 30 minutes  Continue medications as prescribed    ADD  On Vyvanse  Doing quite well able to concentrate  Able to hold a full-time job  Without patient cannot complete tasks    Severe anxiety with panic  On alprazolam twice daily  Which patient ends up going to the emergency room multiple times  She takes her medication responsibly as prescribed without side effects to report    OARRS:  No data recorded  I have personally reviewed the OARRS report for Donna Emery. I have considered the risks  of abuse, dependence, addiction and diversion    Is the patient prescribed a combination of a benzodiazepine and opioid?  No    Last Urine Drug Screen / ordered today: Yes  Recent Results (from the past 8760 hour(s))   Benzodiazepine Confirmation, Urine    Collection Time: 03/19/24 10:37 AM   Result Value Ref Range    Clonazepam <25 <25 ng/mL    7-Aminoclonazepam <25 <25 ng/mL    Alprazolam 79 (H) <25 ng/mL    Alpha-Hydroxyalprazolam 489 (H) <25 ng/mL    Midazolam <25 <25 ng/mL    Alpha-Hydroxymidazolam <25 <25 ng/mL    Chlordiazepoxide <25 <25 ng/mL    Diazepam <25 <25 ng/mL    Nordiazepam <25 <25 ng/mL    Temazepam <25 <25 ng/mL    Oxazepam <25 <25 ng/mL    Lorazepam <25 <25 ng/mL   Amphetamine Confirm, Urine    Collection Time: 03/19/24 10:37 AM   Result Value Ref Range    Methamphetamine Quant, Ur <200 ng/mL    MDA, Urine <200 ng/mL    MDEA, Urine <200 ng/mL    Phentermine,Urine <200 ng/mL    Amphetamines,Urine >5000 ng/mL    MDMA, Urine <200 ng/mL   Drug Screen, Urine With Reflex to Confirmation    Collection Time: 03/19/24 10:37 AM   Result Value Ref Range    Amphetamine Screen, Urine Presumptive Positive (A) Presumptive Negative    Barbiturate Screen, Urine Presumptive Negative Presumptive Negative    Benzodiazepines Screen, Urine Presumptive Positive (A) Presumptive Negative    Cannabinoid Screen, Urine Presumptive Negative Presumptive Negative    Cocaine Metabolite Screen, Urine Presumptive Negative Presumptive Negative    Fentanyl Screen, Urine Presumptive Negative Presumptive Negative    Opiate Screen, Urine Presumptive Negative Presumptive Negative    Oxycodone Screen, Urine Presumptive Negative Presumptive Negative    PCP Screen, Urine Presumptive Negative Presumptive Negative    Methadone Screen, Urine Presumptive Negative Presumptive Negative   Opiate Confirmation, Urine    Collection Time: 03/12/24 10:35 AM   Result Value Ref Range    6-Acetylmorphine <25 <25 ng/mL    Codeine <50 <50 ng/mL     Hydrocodone <25 <25 ng/mL    Hydromorphone <25 <25 ng/mL    Morphine  370 (H) <50 ng/mL    Norhydrocodone <25 <25 ng/mL    Noroxycodone <25 <25 ng/mL    Oxycodone <25 <25 ng/mL    Oxymorphone <25 <25 ng/mL     Results are as expected.         Controlled Substance Agreement:  Date of the Last Agreement: 08/2023  Reviewed Controlled Substance Agreement including but not limited to the benefits, risks, and alternatives to treatment with a Controlled Substance medication(s).    Benzodiazepines:  What is the patient's goal of therapy?  Anxiety with panic  Is this being achieved with current treatment?  Yes    CHEKO-7:  No data recorded      Activities of Daily Living:   Is your overall impression that this patient is benefiting (symptom reduction outweighs side effects) from benzodiazepine therapy? Yes     1. Physical Functioning: Better  2. Family Relationship: Better  3. Social Relationship: Better  4. Mood: Better  5. Sleep Patterns: Better  6. Overall Function: Better and Stimulants:   What is the patient's goal of therapy?  Attention deficit disorder  Is this being achieved with current treatment? yes    Activities of Daily Living:   Is your overall impression that this patient is benefiting (symptom reduction outweighs side effects) from stimulant therapy? Yes     1. Physical Functioning: Better  2. Family Relationship: Better  3. Social Relationship: Better  4. Mood: Better  5. Sleep Patterns: Better  6. Overall Function: Better          Problem List Items Addressed This Visit    None            Sravan New MD

## 2024-07-23 ENCOUNTER — APPOINTMENT (OUTPATIENT)
Dept: PRIMARY CARE | Facility: CLINIC | Age: 59
End: 2024-07-23
Payer: COMMERCIAL

## 2024-07-23 DIAGNOSIS — J01.10 ACUTE NON-RECURRENT FRONTAL SINUSITIS: Primary | ICD-10-CM

## 2024-07-23 DIAGNOSIS — J02.9 SORE THROAT: ICD-10-CM

## 2024-07-23 PROCEDURE — 99213 OFFICE O/P EST LOW 20 MIN: CPT | Performed by: INTERNAL MEDICINE

## 2024-07-23 RX ORDER — AMOXICILLIN AND CLAVULANATE POTASSIUM 500; 125 MG/1; MG/1
500 TABLET, FILM COATED ORAL 3 TIMES DAILY
Qty: 30 TABLET | Refills: 0 | Status: SHIPPED | OUTPATIENT
Start: 2024-07-23 | End: 2024-08-02

## 2024-07-29 ENCOUNTER — TELEPHONE (OUTPATIENT)
Dept: PRIMARY CARE | Facility: CLINIC | Age: 59
End: 2024-07-29
Payer: COMMERCIAL

## 2024-07-31 DIAGNOSIS — F90.0 ATTENTION DEFICIT HYPERACTIVITY DISORDER (ADHD), PREDOMINANTLY INATTENTIVE TYPE: ICD-10-CM

## 2024-07-31 RX ORDER — LISDEXAMFETAMINE DIMESYLATE 30 MG/1
30 CAPSULE ORAL DAILY
Qty: 30 CAPSULE | Refills: 0 | Status: SHIPPED | OUTPATIENT
Start: 2024-07-31

## 2024-07-31 NOTE — PROGRESS NOTES
History of present illness:  Donna Emery is a 59 y.o. female presenting today for follow-up. Patient has a right sided mastoid cavity. She is here for debridement.     RECALL 10/3/22  The patient is a 57-year-old female presenting in clinic for a follow-up visit for ear cleaning. She had a scab over the tympanic membrane on the right side, and was reluctant to remove it given her past otological history. She feels that her ear is plugged. She reports to experiencing headaches and slightly diminished hearing.     RECALL 12/20/21:  This is a 56-year-old female well-known to me with her last visit with me in 2016. Briefly, her history included multiple previous surgeries on her right ear, as well as a issue with chronic myringitis and MRSA otitis externa. Since her last visit with me, she has been seen by Shiloh dinh for observation as well as ear cleaning. She was seen earlier this month by Pilar Maki in which there is a concern that there was a possible tympanic membrane perforation of the right ear. She was then sent to follow-up with me to consider possible repair. She reports her only symptom since that time has been some pressure more than pain, as well as possibly some reduced hearing in that right side. Otherwise denies any issues with dizziness, otorrhea.    The patient's current medications, active allergies and list of medical problems were reviewed in the EHR and confirmed electronically there are as follows;  Allergies:   Allergies   Allergen Reactions    Sulfamethoxazole-Trimethoprim Unknown    Sulfone Unknown     Current list of medications:   Current Outpatient Medications   Medication Sig Dispense Refill    albuterol 90 mcg/actuation inhaler Inhale 2 puffs.      alirocumab (Praluent Pen) 150 mg/mL pen injector Inject 150 mg under the skin every 14 (fourteen) days.      ALPRAZolam (Xanax) 0.5 mg tablet Take 1 tablet (0.5 mg) by mouth 2 times a day. 60 tablet 0    amoxicillin-pot clavulanate  (Augmentin) 500-125 mg tablet Take 1 tablet (500 mg) by mouth 3 times a day for 10 days. 30 tablet 0    chlorhexidine (Peridex) 0.12 % solution RINSE MOUTH WITH 15ML FOR 30 SECONDS in the AM AND Pm after brushing teeth. spit  AFTER RINSING, DO NOT SWALLOW      cholecalciferol (Vitamin D-3) 50,000 unit capsule Take 1 capsule (50,000 Units) by mouth 1 (one) time per week. 4 capsule 3    estradiol (Vagifem) 10 mcg tablet vaginal tablet Insert 1 tablet (10 mcg) into the vagina.      estrogens, conjugated, (Premarin) vaginal cream 0.5 gm Vaginal twice weekly for 90 days      evolocumab (Repatha SureClick) 140 mg/mL injection Inject 1 mL (140 mg) under the skin every 14 (fourteen) days.      evolocumab (Repatha SureClick) 140 mg/mL injection Inject 1 mL (140 mg) under the skin every 14 (fourteen) days. 6 mL 3    ezetimibe (Zetia) 10 mg tablet Take 1 tablet (10 mg) by mouth once daily. 30 tablet 11    ezetimibe (Zetia) 10 mg tablet Take 1 tablet (10 mg) by mouth once daily. 30 tablet 11    ketoconazole (NIZOral) 2 % cream Apply topically 2 times a day. 30 g 0    lisdexamfetamine (Vyvanse) 30 mg capsule Take 1 capsule (30 mg) by mouth once daily. 30 capsule 0    nitrofurantoin, macrocrystal-monohydrate, (Macrobid) 100 mg capsule Take 1 capsule (100 mg) by mouth every 12 hours. 30 capsule 1     No current facility-administered medications for this visit.     Problem list:  Patient Active Problem List   Diagnosis    Abdominal pain    Acquired pes planus of left foot    Asthmatic bronchitis ([facility])    ADD (attention deficit disorder)    Allergy    Anxiety and depression    Anxiety    Atypical chest pain    Conductive hearing loss in right ear    Conductive hearing loss of right ear with unrestricted hearing of left ear    Hearing loss, conductive    Deviated nasal septum    Diarrhea    Dyslipidemia    Edema of right ankle    Facial pressure    GERD (gastroesophageal reflux disease)    Hypercholesterolemia    Impacted cerumen  of both ears    Impacted cerumen of right ear    Low back pain    Low TSH level    Metatarsalgia of left foot    Mix cndct/snrl hear loss,uni,r ear,w unrestr hear cntra side    Mixed conductive and sensorineural hearing loss of right ear with unrestricted hearing of left ear    MRSA infection (methicillin-resistant Staphylococcus aureus)    Multiple thyroid nodules    Muscle cramps    Earache    Muscle pain, fibromyalgia    Myringitis, chronic    Nasal vestibulitis    Persistent cough    Recurrent dry cough    Recurrent nonproductive cough    Opioid dependence (Multi)    Osteoporosis    Otalgia of both ears    Otalgia of right ear    Post-nasal drip    Rib pain on left side    Scabies infestation    Seasonal allergies    Shingles (herpes zoster) polyneuropathy    Shortness of breath    Sinus congestion    Stress fracture of tarsal bone of left foot    Sore throat    Thyroid nodule    Tiredness    TMJ crepitus    Urinary tract infection    Vaginal yeast infection    Viral illness    Vitamin D deficiency    Xanthelasma    Metatarsal stress fracture of left foot    Dermatitis    Cough    Anticipatory anxiety    Spondylosis, lumbosacral    Statin-induced myositis    Acute bronchitis    Acute maxillary sinusitis    High coronary artery calcium score    Acute non-recurrent frontal sinusitis         Physical Examination:  CONSTITUTIONAL:  No acute distress  VOICE:  No hoarseness or other abnormality  RESPIRATION:  Breathing comfortably, no stridor  CV:  No clubbing/cyanosis/edema in hands  EYES:  EOM intact, sclera clear  NEURO:  Alert and oriented times 3, Cranial nerves II-XII grossly intact and symmetric bilaterally  HEAD AND FACE:  Symmetric facial features, no masses or lesions  RIGHT EAR:  Mastoid cavity debrided using otomicroscope and micro instrumentation. Significant amount of debris and crusting were removed. Graft is intact. No signs of infection. No cholesteatoma. No evidence of middle ear effusion. LEFT EAR:  Normal external ear and post auricular area, no visible lesions, external auditory canal patent, tympanic membrane intact, no retraction, no signs of mass, effusion, or infection within the middle ear  NOSE:  Anterior rhinoscopy clear, no significant external deformity.  ORAL CAVITY/OROPHARYNX/LIPS:  normal lining, mobile tongue.  PHARYNGEAL WALLS: Moist mucosal lining, good palatal elevation  NECK/LYMPH:  No LAD, no thyroid masses, trachea midline  SKIN:  Neck and facial skin is without scar or injury  PSYCH:  Alert and oriented with appropriate mood and affect    Impression:    History of tympanomastoidectomy for cholesteatoma right ear  Encounter post mastoid cavity right-sided    Recommendation:  No active otological issues. Follow-up as needed. She will see our nurse practitioner in a year for mastoid debridement.     I discussed with the patient the complexity of my medical decision making including the treatment and testing rational, indications of their elective procedure and possible adverse effects and/or complications. Based on the provided documentation and my professional assessment of this patient's chronic stable condition, the complexity of evaluation and treatment is low.    This note was created using speech recognition transcription software. Despite proofreading, several typographical errors might be present that might affect the meaning of the content. Please call with any questions.    Scribe Attestation  By signing my name below, IAnnie , Carmen   attest that this documentation has been prepared under the direction and in the presence of Luciana Newton MD.      Scribe Attestation  By signing my name below, IVelma , Carmen attest that this documentation has been prepared under the direction and in the presence of Luciana Newton MD.

## 2024-08-02 ENCOUNTER — APPOINTMENT (OUTPATIENT)
Dept: OTOLARYNGOLOGY | Facility: CLINIC | Age: 59
End: 2024-08-02
Payer: COMMERCIAL

## 2024-08-02 VITALS — HEIGHT: 63 IN | BODY MASS INDEX: 25.34 KG/M2 | WEIGHT: 143 LBS

## 2024-08-02 DIAGNOSIS — H90.71 MIXED CONDUCTIVE AND SENSORINEURAL HEARING LOSS OF RIGHT EAR WITH UNRESTRICTED HEARING OF LEFT EAR: Primary | ICD-10-CM

## 2024-08-02 DIAGNOSIS — H95.191 ENCOUNTER FOR MASTOIDECTOMY CAVITY DEBRIDEMENT, RIGHT: ICD-10-CM

## 2024-08-02 ASSESSMENT — PATIENT HEALTH QUESTIONNAIRE - PHQ9
SUM OF ALL RESPONSES TO PHQ9 QUESTIONS 1 AND 2: 0
1. LITTLE INTEREST OR PLEASURE IN DOING THINGS: NOT AT ALL
2. FEELING DOWN, DEPRESSED OR HOPELESS: NOT AT ALL

## 2024-08-12 DIAGNOSIS — F41.9 ANXIETY: ICD-10-CM

## 2024-08-12 RX ORDER — ALPRAZOLAM 0.5 MG/1
0.5 TABLET ORAL 2 TIMES DAILY
Qty: 60 TABLET | Refills: 0 | Status: SHIPPED | OUTPATIENT
Start: 2024-08-12

## 2024-08-20 DIAGNOSIS — F90.0 ATTENTION DEFICIT HYPERACTIVITY DISORDER (ADHD), PREDOMINANTLY INATTENTIVE TYPE: ICD-10-CM

## 2024-08-20 RX ORDER — LISDEXAMFETAMINE DIMESYLATE 30 MG/1
30 CAPSULE ORAL DAILY
Qty: 15 CAPSULE | Refills: 0 | OUTPATIENT
Start: 2024-08-20

## 2024-08-26 DIAGNOSIS — E55.9 VITAMIN D INSUFFICIENCY: ICD-10-CM

## 2024-08-26 DIAGNOSIS — F90.0 ATTENTION DEFICIT HYPERACTIVITY DISORDER (ADHD), PREDOMINANTLY INATTENTIVE TYPE: ICD-10-CM

## 2024-08-26 RX ORDER — LISDEXAMFETAMINE DIMESYLATE 30 MG/1
30 CAPSULE ORAL DAILY
Qty: 15 CAPSULE | Refills: 0 | Status: SHIPPED | OUTPATIENT
Start: 2024-08-26

## 2024-08-26 NOTE — TELEPHONE ENCOUNTER
Please approve the patient Vyvanse 30mg capsule for #15  Per the pharmacist they only gave her a total of #15 because that was all they had in stock at the moment.

## 2024-09-10 DIAGNOSIS — F90.0 ATTENTION DEFICIT HYPERACTIVITY DISORDER (ADHD), PREDOMINANTLY INATTENTIVE TYPE: ICD-10-CM

## 2024-09-10 RX ORDER — LISDEXAMFETAMINE DIMESYLATE 30 MG/1
30 CAPSULE ORAL DAILY
Qty: 15 CAPSULE | Refills: 0 | Status: SHIPPED | OUTPATIENT
Start: 2024-09-10

## 2024-09-13 DIAGNOSIS — F41.9 ANXIETY: ICD-10-CM

## 2024-09-13 RX ORDER — ALPRAZOLAM 0.5 MG/1
0.5 TABLET ORAL 2 TIMES DAILY
Qty: 60 TABLET | Refills: 0 | Status: SHIPPED | OUTPATIENT
Start: 2024-09-13

## 2024-09-16 ENCOUNTER — HOSPITAL ENCOUNTER (OUTPATIENT)
Dept: RADIOLOGY | Facility: CLINIC | Age: 59
Discharge: HOME | End: 2024-09-16
Payer: COMMERCIAL

## 2024-09-16 DIAGNOSIS — R10.84 GENERALIZED ABDOMINAL PAIN: ICD-10-CM

## 2024-09-16 PROCEDURE — 74177 CT ABD & PELVIS W/CONTRAST: CPT

## 2024-09-16 PROCEDURE — 74177 CT ABD & PELVIS W/CONTRAST: CPT | Performed by: RADIOLOGY

## 2024-09-16 PROCEDURE — 2550000001 HC RX 255 CONTRASTS: Performed by: INTERNAL MEDICINE

## 2024-09-17 ENCOUNTER — TELEPHONE (OUTPATIENT)
Dept: PRIMARY CARE | Facility: CLINIC | Age: 59
End: 2024-09-17
Payer: COMMERCIAL

## 2024-09-17 NOTE — TELEPHONE ENCOUNTER
Patient called requesting a call back to discuss the results of her CT with Contrast done yesterday when the results are in. She stated she can be reached at [phone].

## 2024-09-18 DIAGNOSIS — K63.89 CECUM MASS: Primary | ICD-10-CM

## 2024-09-19 ENCOUNTER — HOSPITAL ENCOUNTER (EMERGENCY)
Facility: HOSPITAL | Age: 59
Discharge: HOME | End: 2024-09-19
Attending: EMERGENCY MEDICINE
Payer: COMMERCIAL

## 2024-09-19 ENCOUNTER — APPOINTMENT (OUTPATIENT)
Dept: CARDIOLOGY | Facility: HOSPITAL | Age: 59
End: 2024-09-19
Payer: COMMERCIAL

## 2024-09-19 ENCOUNTER — APPOINTMENT (OUTPATIENT)
Dept: RADIOLOGY | Facility: HOSPITAL | Age: 59
End: 2024-09-19
Payer: COMMERCIAL

## 2024-09-19 VITALS
HEIGHT: 63 IN | DIASTOLIC BLOOD PRESSURE: 82 MMHG | RESPIRATION RATE: 18 BRPM | OXYGEN SATURATION: 98 % | BODY MASS INDEX: 24.8 KG/M2 | WEIGHT: 140 LBS | TEMPERATURE: 98.1 F | SYSTOLIC BLOOD PRESSURE: 123 MMHG | HEART RATE: 72 BPM

## 2024-09-19 DIAGNOSIS — R07.9 CHEST PAIN, UNSPECIFIED TYPE: Primary | ICD-10-CM

## 2024-09-19 DIAGNOSIS — I10 DIASTOLIC HYPERTENSION: ICD-10-CM

## 2024-09-19 DIAGNOSIS — N39.0 ACUTE LOWER URINARY TRACT INFECTION: ICD-10-CM

## 2024-09-19 DIAGNOSIS — R10.84 GENERALIZED ABDOMINAL PAIN: ICD-10-CM

## 2024-09-19 LAB
ALBUMIN SERPL BCP-MCNC: 4.1 G/DL (ref 3.4–5)
ALP SERPL-CCNC: 50 U/L (ref 33–110)
ALT SERPL W P-5'-P-CCNC: 16 U/L (ref 7–45)
ANION GAP SERPL CALCULATED.3IONS-SCNC: 11 MMOL/L (ref 10–20)
APPEARANCE UR: CLEAR
AST SERPL W P-5'-P-CCNC: 17 U/L (ref 9–39)
ATRIAL RATE: 82 BPM
B-HCG SERPL-ACNC: 4 MIU/ML
BASOPHILS # BLD AUTO: 0.08 X10*3/UL (ref 0–0.1)
BASOPHILS NFR BLD AUTO: 1.9 %
BILIRUB SERPL-MCNC: 0.4 MG/DL (ref 0–1.2)
BILIRUB UR STRIP.AUTO-MCNC: NEGATIVE MG/DL
BUN SERPL-MCNC: 13 MG/DL (ref 6–23)
CALCIUM SERPL-MCNC: 9.3 MG/DL (ref 8.6–10.3)
CARDIAC TROPONIN I PNL SERPL HS: <3 NG/L (ref 0–13)
CARDIAC TROPONIN I PNL SERPL HS: <3 NG/L (ref 0–13)
CHLORIDE SERPL-SCNC: 106 MMOL/L (ref 98–107)
CO2 SERPL-SCNC: 26 MMOL/L (ref 21–32)
COLOR UR: ABNORMAL
CREAT SERPL-MCNC: 0.67 MG/DL (ref 0.5–1.05)
D DIMER PPP FEU-MCNC: 0.2 MG/L FEU (ref 0.19–0.5)
EGFRCR SERPLBLD CKD-EPI 2021: >90 ML/MIN/1.73M*2
EOSINOPHIL # BLD AUTO: 0.16 X10*3/UL (ref 0–0.7)
EOSINOPHIL NFR BLD AUTO: 3.8 %
ERYTHROCYTE [DISTWIDTH] IN BLOOD BY AUTOMATED COUNT: 13.6 % (ref 11.5–14.5)
GLUCOSE SERPL-MCNC: 98 MG/DL (ref 74–99)
GLUCOSE UR STRIP.AUTO-MCNC: NORMAL MG/DL
HCT VFR BLD AUTO: 40.2 % (ref 36–46)
HGB BLD-MCNC: 13.1 G/DL (ref 12–16)
HOLD SPECIMEN: NORMAL
IMM GRANULOCYTES # BLD AUTO: 0 X10*3/UL (ref 0–0.7)
IMM GRANULOCYTES NFR BLD AUTO: 0 % (ref 0–0.9)
KETONES UR STRIP.AUTO-MCNC: NEGATIVE MG/DL
LEUKOCYTE ESTERASE UR QL STRIP.AUTO: ABNORMAL
LYMPHOCYTES # BLD AUTO: 1.73 X10*3/UL (ref 1.2–4.8)
LYMPHOCYTES NFR BLD AUTO: 41.5 %
MCH RBC QN AUTO: 28.2 PG (ref 26–34)
MCHC RBC AUTO-ENTMCNC: 32.6 G/DL (ref 32–36)
MCV RBC AUTO: 87 FL (ref 80–100)
MONOCYTES # BLD AUTO: 0.38 X10*3/UL (ref 0.1–1)
MONOCYTES NFR BLD AUTO: 9.1 %
MUCOUS THREADS #/AREA URNS AUTO: NORMAL /LPF
NEUTROPHILS # BLD AUTO: 1.82 X10*3/UL (ref 1.2–7.7)
NEUTROPHILS NFR BLD AUTO: 43.7 %
NITRITE UR QL STRIP.AUTO: NEGATIVE
NRBC BLD-RTO: 0 /100 WBCS (ref 0–0)
P AXIS: 49 DEGREES
P OFFSET: 188 MS
P ONSET: 150 MS
PH UR STRIP.AUTO: 6.5 [PH]
PLATELET # BLD AUTO: 290 X10*3/UL (ref 150–450)
POTASSIUM SERPL-SCNC: 3.8 MMOL/L (ref 3.5–5.3)
PR INTERVAL: 140 MS
PROT SERPL-MCNC: 7 G/DL (ref 6.4–8.2)
PROT UR STRIP.AUTO-MCNC: NEGATIVE MG/DL
Q ONSET: 220 MS
QRS COUNT: 14 BEATS
QRS DURATION: 78 MS
QT INTERVAL: 364 MS
QTC CALCULATION(BAZETT): 425 MS
QTC FREDERICIA: 404 MS
R AXIS: 24 DEGREES
RBC # BLD AUTO: 4.64 X10*6/UL (ref 4–5.2)
RBC # UR STRIP.AUTO: NEGATIVE /UL
RBC #/AREA URNS AUTO: NORMAL /HPF
SARS-COV-2 RNA RESP QL NAA+PROBE: NOT DETECTED
SODIUM SERPL-SCNC: 139 MMOL/L (ref 136–145)
SP GR UR STRIP.AUTO: 1.02
SQUAMOUS #/AREA URNS AUTO: NORMAL /HPF
T AXIS: 27 DEGREES
T OFFSET: 402 MS
UROBILINOGEN UR STRIP.AUTO-MCNC: NORMAL MG/DL
VENTRICULAR RATE: 82 BPM
WBC # BLD AUTO: 4.2 X10*3/UL (ref 4.4–11.3)
WBC #/AREA URNS AUTO: NORMAL /HPF

## 2024-09-19 PROCEDURE — 96374 THER/PROPH/DIAG INJ IV PUSH: CPT

## 2024-09-19 PROCEDURE — 85025 COMPLETE CBC W/AUTO DIFF WBC: CPT | Performed by: EMERGENCY MEDICINE

## 2024-09-19 PROCEDURE — 36415 COLL VENOUS BLD VENIPUNCTURE: CPT | Performed by: EMERGENCY MEDICINE

## 2024-09-19 PROCEDURE — 2500000001 HC RX 250 WO HCPCS SELF ADMINISTERED DRUGS (ALT 637 FOR MEDICARE OP): Performed by: EMERGENCY MEDICINE

## 2024-09-19 PROCEDURE — 81001 URINALYSIS AUTO W/SCOPE: CPT | Performed by: EMERGENCY MEDICINE

## 2024-09-19 PROCEDURE — 80053 COMPREHEN METABOLIC PANEL: CPT | Performed by: EMERGENCY MEDICINE

## 2024-09-19 PROCEDURE — 87086 URINE CULTURE/COLONY COUNT: CPT | Mod: WESLAB | Performed by: EMERGENCY MEDICINE

## 2024-09-19 PROCEDURE — 96361 HYDRATE IV INFUSION ADD-ON: CPT

## 2024-09-19 PROCEDURE — 93005 ELECTROCARDIOGRAM TRACING: CPT

## 2024-09-19 PROCEDURE — 87635 SARS-COV-2 COVID-19 AMP PRB: CPT | Performed by: EMERGENCY MEDICINE

## 2024-09-19 PROCEDURE — 85300 ANTITHROMBIN III ACTIVITY: CPT | Performed by: EMERGENCY MEDICINE

## 2024-09-19 PROCEDURE — 71045 X-RAY EXAM CHEST 1 VIEW: CPT | Performed by: RADIOLOGY

## 2024-09-19 PROCEDURE — 99284 EMERGENCY DEPT VISIT MOD MDM: CPT | Mod: 25

## 2024-09-19 PROCEDURE — 96375 TX/PRO/DX INJ NEW DRUG ADDON: CPT

## 2024-09-19 PROCEDURE — 84484 ASSAY OF TROPONIN QUANT: CPT | Performed by: EMERGENCY MEDICINE

## 2024-09-19 PROCEDURE — 71045 X-RAY EXAM CHEST 1 VIEW: CPT

## 2024-09-19 PROCEDURE — 84702 CHORIONIC GONADOTROPIN TEST: CPT | Performed by: EMERGENCY MEDICINE

## 2024-09-19 PROCEDURE — 2500000004 HC RX 250 GENERAL PHARMACY W/ HCPCS (ALT 636 FOR OP/ED): Performed by: EMERGENCY MEDICINE

## 2024-09-19 RX ORDER — MORPHINE SULFATE 4 MG/ML
4 INJECTION, SOLUTION INTRAMUSCULAR; INTRAVENOUS ONCE
Status: DISCONTINUED | OUTPATIENT
Start: 2024-09-19 | End: 2024-09-19 | Stop reason: HOSPADM

## 2024-09-19 RX ORDER — FAMOTIDINE 10 MG/ML
20 INJECTION INTRAVENOUS ONCE
Status: COMPLETED | OUTPATIENT
Start: 2024-09-19 | End: 2024-09-19

## 2024-09-19 RX ORDER — ONDANSETRON HYDROCHLORIDE 2 MG/ML
4 INJECTION, SOLUTION INTRAVENOUS ONCE
Status: COMPLETED | OUTPATIENT
Start: 2024-09-19 | End: 2024-09-19

## 2024-09-19 RX ORDER — CEPHALEXIN 500 MG/1
500 CAPSULE ORAL 2 TIMES DAILY
Qty: 14 CAPSULE | Refills: 0 | Status: SHIPPED | OUTPATIENT
Start: 2024-09-19 | End: 2024-09-26

## 2024-09-19 RX ORDER — ASPIRIN 325 MG
325 TABLET ORAL ONCE
Status: COMPLETED | OUTPATIENT
Start: 2024-09-19 | End: 2024-09-19

## 2024-09-19 RX ADMIN — ASPIRIN 325 MG: 325 TABLET ORAL at 09:33

## 2024-09-19 RX ADMIN — FAMOTIDINE 20 MG: 10 INJECTION, SOLUTION INTRAVENOUS at 09:33

## 2024-09-19 RX ADMIN — SODIUM CHLORIDE 1000 ML: 900 INJECTION, SOLUTION INTRAVENOUS at 09:32

## 2024-09-19 RX ADMIN — ONDANSETRON 4 MG: 2 INJECTION INTRAMUSCULAR; INTRAVENOUS at 09:33

## 2024-09-19 ASSESSMENT — PAIN - FUNCTIONAL ASSESSMENT: PAIN_FUNCTIONAL_ASSESSMENT: 0-10

## 2024-09-19 ASSESSMENT — PAIN SCALES - GENERAL
PAINLEVEL_OUTOF10: 0 - NO PAIN
PAINLEVEL_OUTOF10: 4
PAINLEVEL_OUTOF10: 0 - NO PAIN

## 2024-09-19 ASSESSMENT — COLUMBIA-SUICIDE SEVERITY RATING SCALE - C-SSRS
2. HAVE YOU ACTUALLY HAD ANY THOUGHTS OF KILLING YOURSELF?: NO
1. IN THE PAST MONTH, HAVE YOU WISHED YOU WERE DEAD OR WISHED YOU COULD GO TO SLEEP AND NOT WAKE UP?: NO
6. HAVE YOU EVER DONE ANYTHING, STARTED TO DO ANYTHING, OR PREPARED TO DO ANYTHING TO END YOUR LIFE?: NO

## 2024-09-19 NOTE — ED PROVIDER NOTES
HPI   Chief Complaint   Patient presents with    Abdominal Pain    Chest Pain    Shortness of Breath       This is a 59-year-old female presenting to the emergency department with ongoing pain in her left lower rib cage upper abdomen for the last month.  Patient states over the last few weeks the pain is increased prompting her to come to the emergency department.  She states when she first take up in the morning the pain is not as significant but throughout the day it increases.  She states the pain will radiate into her left upper back and into her left shoulder.  She states it is causing her some shortness of breath and pain with inspiration.  She did have a dry cough started a few days ago.  No nausea, vomiting, diarrhea or constipation.  No recent travel or recent surgeries.  She was seen by her primary care doctor for this and reportedly had a CT scan done.      Please see HPI for pertinent positive and negative ROS.         Patient History   Past Medical History:   Diagnosis Date    Chronic myringitis, right ear 01/23/2017    Chronic myringitis of right ear    Other enthesopathy of unspecified foot and ankle 10/16/2015    Foot tendinitis    Otitis media, unspecified, right ear 12/08/2017    Acute right otitis media    Otitis media, unspecified, right ear 03/14/2017    Right acute otitis media    Pain in left leg 10/27/2015    Lower extremity pain, posterior, left    Pain in unspecified ankle and joints of unspecified foot 05/23/2016    Ankle pain    Personal history of other diseases of the nervous system and sense organs     History of perforation of tympanic membrane    Personal history of other diseases of the respiratory system 11/02/2017    History of acute sinusitis    Personal history of other diseases of the respiratory system 12/08/2017    History of acute bronchitis with bronchospasm    Personal history of other diseases of the respiratory system 03/14/2017    History of acute bronchitis with  bronchospasm    Personal history of other diseases of the respiratory system 01/20/2020    History of acute bacterial sinusitis    Personal history of other diseases of the respiratory system 08/10/2015    History of bronchitis    Personal history of other diseases of the respiratory system 08/10/2015    History of sinusitis    Personal history of other specified conditions 07/27/2016    History of headache    Personal history of other specified conditions 03/30/2016    History of diarrhea    Unspecified acute conjunctivitis, right eye 11/02/2017    Acute conjunctivitis of right eye, unspecified acute conjunctivitis type    Unspecified chronic otitis externa, unspecified ear 12/04/2015    Chronic otitis externa     Past Surgical History:   Procedure Laterality Date    OTHER SURGICAL HISTORY  01/29/2014    Ear Surgery    OTHER SURGICAL HISTORY  12/19/2017    Oophorectomy - Bilat (Removal Of Both Ovaries) Laparoscopic     Family History   Problem Relation Name Age of Onset    Rheum arthritis Mother      Arthritis Other Family Hx     Asthma Other Family Hx     COPD Other Family Hx     Diabetes Other Family Hx     Cancer Other Family Hx     Heart attack Other Family Hx     Leukemia Other Family Hx         chronic     Social History     Tobacco Use    Smoking status: Never    Smokeless tobacco: Never   Substance Use Topics    Alcohol use: Never    Drug use: Never       Physical Exam   ED Triage Vitals   Temperature Heart Rate Respirations BP   09/19/24 0856 09/19/24 0856 09/19/24 0856 09/19/24 0856   36.7 °C (98.1 °F) 95 16 136/82      Pulse Ox Temp src Heart Rate Source Patient Position   09/19/24 0856 -- 09/19/24 0945 09/19/24 0945   98 %  Monitor Lying      BP Location FiO2 (%)     09/19/24 0945 --     Right arm        Physical Exam  GENERAL APPEARANCE: Awake and alert. No acute respiratory distress.   VITAL SIGNS: As per the nurses' triage record.  HEENT: Normocephalic, atraumatic.   NECK: Soft, nontender and  supple  CHEST: Tenderness to palpation over the left anterior inferior rib cage.  No crepitus.  Clear to auscultation bilaterally. No rales, rhonchi, or wheezing. Symmetric rise and fall of chest wall.   HEART: Clear S1 and S2. Regular rate and rhythm. No murmurs appreciated on auscultation.  Strong and equal pulses in the extremities.  ABDOMEN: Soft, tenderness to palpation in the left upper quadrant, nondistended, positive bowel sounds, no palpable masses.  MUSCULOSKELETAL: The calves are nontender to palpation. Full gross active range of motion. Ambulating on own with no acute difficulties  NEUROLOGICAL: Awake, alert and oriented x 3. Motor power intact in the upper and lower extremities. Sensation is intact to light touch in the upper and lower extremities. Patient answering questions appropriately.   IMMUNOLOGICAL: No lymphatic streaking noted  DERMATOLOGIC: Warm and dry without petechiae, rashes, or ecchymosis noted on visible skin.   PYSCH: Cooperative with appropriate mood and affect.    ED Course & MDM   Diagnoses as of 09/19/24 1315   Chest pain, unspecified type   Generalized abdominal pain   Diastolic hypertension   Acute lower urinary tract infection                 No data recorded     [place] Coma Scale Score: 15 (09/19/24 0951 : Isra Razo RN)                           Medical Decision Making  Parts of this chart have been completed using voice recognition software. Please excuse any errors of transcription.  My thought process and reason for plan has been formulated from the time that I saw the patient until the time of disposition and is not specific to one specific moment during their visit and furthermore my MDM encompasses this entire chart and not only this text box.      HPI: Detailed above.    Exam: A medically appropriate exam performed, outlined above, given the known history and presentation.    History obtained from: Patient    EKG: See my supervising physician's EKG  interpretation    Medications given during visit:  Medications   morphine injection 4 mg (4 mg intravenous Not Given 9/19/24 0940)   famotidine PF (Pepcid) injection 20 mg (20 mg intravenous Given 9/19/24 0933)   aspirin tablet 325 mg (325 mg oral Given 9/19/24 0933)   sodium chloride 0.9 % bolus 1,000 mL (1,000 mL intravenous New Bag 9/19/24 0932)   ondansetron (Zofran) injection 4 mg (4 mg intravenous Given 9/19/24 0933)        Diagnostic/tests  Labs Reviewed   CBC WITH AUTO DIFFERENTIAL - Abnormal       Result Value    WBC 4.2 (*)     nRBC 0.0      RBC 4.64      Hemoglobin 13.1      Hematocrit 40.2      MCV 87      MCH 28.2      MCHC 32.6      RDW 13.6      Platelets 290      Neutrophils % 43.7      Immature Granulocytes %, Automated 0.0      Lymphocytes % 41.5      Monocytes % 9.1      Eosinophils % 3.8      Basophils % 1.9      Neutrophils Absolute 1.82      Immature Granulocytes Absolute, Automated 0.00      Lymphocytes Absolute 1.73      Monocytes Absolute 0.38      Eosinophils Absolute 0.16      Basophils Absolute 0.08     URINALYSIS WITH REFLEX CULTURE AND MICROSCOPIC - Abnormal    Color, Urine Light-Yellow      Appearance, Urine Clear      Specific Gravity, Urine 1.024      pH, Urine 6.5      Protein, Urine NEGATIVE      Glucose, Urine Normal      Blood, Urine NEGATIVE      Ketones, Urine NEGATIVE      Bilirubin, Urine NEGATIVE      Urobilinogen, Urine Normal      Nitrite, Urine NEGATIVE      Leukocyte Esterase, Urine 250 Sandra/µL (*)    COMPREHENSIVE METABOLIC PANEL - Normal    Glucose 98      Sodium 139      Potassium 3.8      Chloride 106      Bicarbonate 26      Anion Gap 11      Urea Nitrogen 13      Creatinine 0.67      eGFR >90      Calcium 9.3      Albumin 4.1      Alkaline Phosphatase 50      Total Protein 7.0      AST 17      Bilirubin, Total 0.4      ALT 16     SARS-COV-2 PCR - Normal    Coronavirus 2019, PCR Not Detected      Narrative:     This assay has received FDA Emergency Use Authorization  (EUA) and is only authorized for the duration of time that circumstances exist to justify the authorization of the emergency use of in vitro diagnostic tests for the detection of SARS-CoV-2 virus and/or diagnosis of COVID-19 infection under section 564(b)(1) of the Act, 21 U.S.C. 360bbb-3(b)(1). This assay is an in vitro diagnostic nucleic acid amplification test for the qualitative detection of SARS-CoV-2 from nasopharyngeal specimens and has been validated for use at [facility]. Negative results do not preclude COVID-19 infections and should not be used as the sole basis for diagnosis, treatment, or other management decisions.     HUMAN CHORIONIC GONADOTROPIN, SERUM QUANTITATIVE - Normal    HCG, Beta-Quantitative 4      Narrative:      Total HCG measurement is performed using the [employer] Access   Immunoassay which detects intact HCG and free beta HCG subunit.    This test is not indicated for use as a tumor marker.   HCG testing is performed using a different test methodology at [facility] than other [facility]. Direct result comparison   should only be made within the same method.       D-DIMER, NON VTE - Normal    D-Dimer Non VTE, Quant (mg/L FEU) 0.20      Narrative:     THROMBOEMBOLIC EVENTS CANNOT BE EXCLUDED SOLELY ON THE BASIS OF THE D-DIMER LEVEL BEING WITHIN THE NORMAL REFERENCE RANGE. D-DIMER LEVELS LESS THAN 0.5 MG/L FEU IN CONJUNCTION WITH A LOW CLINICAL PROBABILITY HAVE AN EXCELLENT NEGATIVE PREDICTIVE VALUE IN EXCLUDING A DIAGNOSIS OF PULMONARY EMBOLUS (PE) OR DEEP VEIN THROMBOSIS (DVT). ELEVATED D-DIMER LEVELS ARE NOT SPECIFIC TO PE OR DVT, AND MAY BE SEEN IN PATIENTS WITH DIC, ADVANCED AGE, PREGNANCY, MALIGNANCY, LIVER DISEASE, INFECTION, AND INFLAMMATORY CONDITIONS AMONG OTHERS. D-DIMER LEVELS MAY BE DECREASED IN PATIENTS RECEIVING ANTI-COAGULATION THERAPY.   TROPONIN I, HIGH SENSITIVITY - Normal    Troponin I, High Sensitivity <3       Narrative:     Less than 99th percentile of normal range cutoff-  Female and children under 18 years old <14 ng/L; Male <21 ng/L: Negative  Repeat testing should be performed if clinically indicated.     Female and children under 18 years old 14-50 ng/L; Male 21-50 ng/L:  Consistent with possible cardiac damage and possible increased clinical   risk. Serial measurements may help to assess extent of myocardial damage.     >50 ng/L: Consistent with cardiac damage, increased clinical risk and  myocardial infarction. Serial measurements may help assess extent of   myocardial damage.      NOTE: Children less than 1 year old may have higher baseline troponin   levels and results should be interpreted in conjunction with the overall   clinical context.     NOTE: Troponin I testing is performed using a different   testing methodology at [facility] than at other   [facility]. Direct result comparisons should only   be made within the same method.   SERIAL TROPONIN-INITIAL - Normal    Troponin I, High Sensitivity <3      Narrative:     Less than 99th percentile of normal range cutoff-  Female and children under 18 years old <14 ng/L; Male <21 ng/L: Negative  Repeat testing should be performed if clinically indicated.     Female and children under 18 years old 14-50 ng/L; Male 21-50 ng/L:  Consistent with possible cardiac damage and possible increased clinical   risk. Serial measurements may help to assess extent of myocardial damage.     >50 ng/L: Consistent with cardiac damage, increased clinical risk and  myocardial infarction. Serial measurements may help assess extent of   myocardial damage.      NOTE: Children less than 1 year old may have higher baseline troponin   levels and results should be interpreted in conjunction with the overall   clinical context.     NOTE: Troponin I testing is performed using a different   testing methodology at [facility] than at other   [facility].  Direct result comparisons should only   be made within the same method.   URINE CULTURE   MICROSCOPIC ONLY, URINE    WBC, Urine 1-5      RBC, Urine 1-2      Squamous Epithelial Cells, Urine 1-9 (SPARSE)      Mucus, Urine FEW     TROPONIN SERIES- (INITIAL, 1 HR)    Narrative:     The following orders were created for panel order Troponin I Series, High Sensitivity (0, 1 HR).  Procedure                               Abnormality         Status                     ---------                               -----------         ------                     Troponin I, High Sensiti...[[id number]]  Normal              Final result                 Please view results for these tests on the individual orders.   LIPASE   URINALYSIS WITH REFLEX CULTURE AND MICROSCOPIC    Narrative:     The following orders were created for panel order Urinalysis with Reflex Culture and Microscopic.  Procedure                               Abnormality         Status                     ---------                               -----------         ------                     Urinalysis with Reflex C...[[id number]]  Abnormal            Final result               Extra Urine Gray Tube[[id number]]                            In process                   Please view results for these tests on the individual orders.   EXTRA URINE GRAY TUBE      XR chest 1 view   Final Result   Negative exam.        MACRO:   None        Signed by: Ion Cavanaugh 9/19/2024 9:30 AM   Dictation workstation:   [id number]           Considerations/further MDM:  Patient was seen in conjucntion with my supervising physician,  Dr. Leslie. Please refer to her note.    Patient presents with stable vital signs.    Differential diagnosis includes was not limited to musculoskeletal pain versus acute cholecystitis versus PE versus pneumonia    Initial troponin I less than 3.  Repeat troponin I less than 3. Heart score 1.  Wells criteria for pulmonary embolism is 0.  D-dimer is 0.20.  Therefore  do not feel CT angio is clinically indicated for further evaluation.     Urinalysis shows 250 leukocytes.  Patient was discharged with a prescription for cephalexin.  CBC shows very mild leukopenia.  CMP unremarkable.    Chest x-ray shows no acute cardiopulmonary process.    Patient was treated with oral aspirin, IV Pepcid, IV Zofran and IV normal saline 1 L.      Patient's laboratory evaluation and imaging were unremarkable in the emergency department.  Do not feel there is a clinical indication at this time for admission.    The patient was evaluated in the emergency department and an etiology requiring emergent treatment or admission to hospital was not identified.  All labs, imaging, and diagnostic studies were reviewed by me and the patient was counseled on clinical impression, expectations, and plan.  Patient was educated to follow-up with PCP in the following 1 to 2 days.  Patient was also told to follow-up with cardiology and gastroenterology listed on her discharge paperwork.  She was discharged with a prescription for cephalexin for urinary tract infection.  All questions from patient were answered.  They elicited understanding and were agreeable to course of treatment.  Patient was discharged in stable condition and given strict return precautions including new or worsening symptoms.    Procedure  Procedures     Liliana Hernandez PA-C  09/19/24 [id number]

## 2024-09-19 NOTE — Clinical Note
Donna Emery was seen and treated in our emergency department on 9/19/2024.  She may return to work on 09/20/2024.       If you have any questions or concerns, please don't hesitate to call.      Ban Leslie MD

## 2024-09-19 NOTE — DISCHARGE INSTRUCTIONS
Follow-up with your primary care physician within 1 to 2 days for further management of your current symptoms and repeat check of your blood pressure.    Follow-up with cardiology and gastroenterology within 1 week for further management of your chest pain and your abdominal pain.    Return to the emergency department sooner with worsening of symptoms or onset of new symptoms

## 2024-09-19 NOTE — PROGRESS NOTES
Subjective   Donna Emery is a 59 y.o. female who presents for ed follow.    HPI  Donna is 59 with a history of ADD low B12 familial hyperlipidemia severe dyslipidemia on multiple medications including Repatha (cannot afford due to price) ezetimibe hx statin induced myositis.  Patient was recently seen at the emergency room September 19, 2024 for abdominal pain chest pain shortness of breath patient was treated and released negative for cardiac and pulmonary workup she was diagnosed with urinary tract infection and treated with antibiotics. Patient complains of abdominal pain spasm, rib cage pain ED workup was negative.    Review of Systems  10 system review pertinent as above  Objective     There were no vitals taken for this visit.   Physical Exam  HEENT: Atraumatic normocephalic the pupils are equal and round and reactive to light the sclerae nonicteric extraocular motion are intact.  Neck: Is supple without JVD no carotid bruits the trachea is midline there are no masses pulses are equal and bilateral with normal upstroke.  Skin: Normal.  Skin good texture.  Moist.  Good turgor.  No lesions, no rashes.  Lymph: No lymphadenopathy appreciated, no masses, no lesions  Lungs: Are clear to auscultation and percussion, good breath sounds bilaterally, no rhonchi, no wheezing, good diaphragmatic excursion.  Heart: Normal rate and normal rhythm S1, S2, no S3, no gallop, murmur or rub.  Abdomen: Soft, nontender, no organomegaly, good bowel sounds.    Extremities: Full range of motion, good pulses bilateral.  No cyanosis, no clubbing or edema.  Neuro: Cranial nerves II-XII are grossly intact there is no sensory or motor deficits.  Able to move all extremities.    Assessment/Plan     ED visit  September 19, 2024  Discharge September 19, 2024  CT abdomen pelvis Negative  Will see GI for Upper and lower   Will see cardiology    Trop EKG negative  Follows with Dr Mosley Cardiology Wed this week    IBS abdomina  Levsin  prn  Fiber and fluid  Dr Mcwilliams GI Consult    Acute Cystitis  Complete ATB    Atherosclerosis  Will see Vascular surgery at [place]  Dr Peterson for atherosclerosis    Workup including high-sensitivity troponin negative  Imaging currently x-ray chest essentially negative  ECG 12-lead reported that nonacute occasional PVCs    Low-fat, low-cholesterol diet.  I recommended Mediterranean diet  Including fish, chicken, vegetables and olive oil  Exercise daily for 30 minutes  Extremely elevated LDL  Continue medications as prescribed  Cannot afford Repatha  Severe statin induced myositis  Cannot use statins    ADD  On Vyvanse  Doing quite well able to concentrate  Able to hold a full-time job  Without patient cannot complete tasks    Severe anxiety with panic  On alprazolam twice daily  Which patient ends up going to the emergency room multiple times  She takes her medication responsibly as prescribed without side effects to report    OARRS:  No data recorded  I have personally reviewed the OARRS report for Donna Emery. I have considered the risks of abuse, dependence, addiction and diversion    Is the patient prescribed a combination of a benzodiazepine and opioid?  No    Last Urine Drug Screen / ordered today: Yes  Recent Results (from the past 8760 hour(s))   Benzodiazepine Confirmation, Urine    Collection Time: 03/19/24 10:37 AM   Result Value Ref Range    Clonazepam <25 <25 ng/mL    7-Aminoclonazepam <25 <25 ng/mL    Alprazolam 79 (H) <25 ng/mL    Alpha-Hydroxyalprazolam 489 (H) <25 ng/mL    Midazolam <25 <25 ng/mL    Alpha-Hydroxymidazolam <25 <25 ng/mL    Chlordiazepoxide <25 <25 ng/mL    Diazepam <25 <25 ng/mL    Nordiazepam <25 <25 ng/mL    Temazepam <25 <25 ng/mL    Oxazepam <25 <25 ng/mL    Lorazepam <25 <25 ng/mL   Amphetamine Confirm, Urine    Collection Time: 03/19/24 10:37 AM   Result Value Ref Range    Methamphetamine Quant, Ur <200 ng/mL    MDA, Urine <200 ng/mL    MDEA, Urine <200 ng/mL     Phentermine,Urine <200 ng/mL    Amphetamines,Urine >5000 ng/mL    MDMA, Urine <200 ng/mL   Drug Screen, Urine With Reflex to Confirmation    Collection Time: 03/19/24 10:37 AM   Result Value Ref Range    Amphetamine Screen, Urine Presumptive Positive (A) Presumptive Negative    Barbiturate Screen, Urine Presumptive Negative Presumptive Negative    Benzodiazepines Screen, Urine Presumptive Positive (A) Presumptive Negative    Cannabinoid Screen, Urine Presumptive Negative Presumptive Negative    Cocaine Metabolite Screen, Urine Presumptive Negative Presumptive Negative    Fentanyl Screen, Urine Presumptive Negative Presumptive Negative    Opiate Screen, Urine Presumptive Negative Presumptive Negative    Oxycodone Screen, Urine Presumptive Negative Presumptive Negative    PCP Screen, Urine Presumptive Negative Presumptive Negative    Methadone Screen, Urine Presumptive Negative Presumptive Negative   Opiate Confirmation, Urine    Collection Time: 03/12/24 10:35 AM   Result Value Ref Range    6-Acetylmorphine <25 <25 ng/mL    Codeine <50 <50 ng/mL    Hydrocodone <25 <25 ng/mL    Hydromorphone <25 <25 ng/mL    Morphine  370 (H) <50 ng/mL    Norhydrocodone <25 <25 ng/mL    Noroxycodone <25 <25 ng/mL    Oxycodone <25 <25 ng/mL    Oxymorphone <25 <25 ng/mL     Results are as expected.         Controlled Substance Agreement:  Date of the Last Agreement: 08/2023  Reviewed Controlled Substance Agreement including but not limited to the benefits, risks, and alternatives to treatment with a Controlled Substance medication(s).    Benzodiazepines:  What is the patient's goal of therapy?  Anxiety with panic  Is this being achieved with current treatment?  Yes    CHEKO-7:  No data recorded      Activities of Daily Living:   Is your overall impression that this patient is benefiting (symptom reduction outweighs side effects) from benzodiazepine therapy? Yes     1. Physical Functioning: Better  2. Family Relationship: Better  3. Social  Relationship: Better  4. Mood: Better  5. Sleep Patterns: Better  6. Overall Function: Better and Stimulants:   What is the patient's goal of therapy?  Attention deficit disorder  Is this being achieved with current treatment? yes    Activities of Daily Living:   Is your overall impression that this patient is benefiting (symptom reduction outweighs side effects) from stimulant therapy? Yes     1. Physical Functioning: Better  2. Family Relationship: Better  3. Social Relationship: Better  4. Mood: Better  5. Sleep Patterns: Better  6. Overall Function: Better          Problem List Items Addressed This Visit    None            Sravan New MD

## 2024-09-19 NOTE — ED TRIAGE NOTES
Pt having chronic issues for over a month with chest pain, abd pain, and SOB, worse last couple of days with new L shoulder pain. Endorses nausea yesterday, denies known fevers.

## 2024-09-19 NOTE — PROGRESS NOTES
Attestation/Supervisory note for HANK Hernandez      The patient is a 59-year-old female presenting to the emergency department for evaluation of left-sided chest pain and left-sided abdominal pain.  She states that she has had symptoms for the past 3 weeks.  She states that she did speak to her primary care physician last week and they did order a CT of her abdomen pelvis that was done on 16 sept 2024.  She states that it was done for the same symptoms.  She states that she still had pain so she called her primary care physician today and was reportedly told to [facility] emergency department for further management of her treatment but she states that she came here because she likes Dr. Mosley (cardiology).  The pain is a constant aching pain.  No better or worse.  No radiation.  She denies any headache or visual changes.  No neck or back pain.  No shortness of breath.  No palpitations.  No diaphoresis.  No fever or chills.  No cough or congestion.  No nausea, vomiting or diarrhea.  No urinary complaints.  No vaginal discharge.  No constipation.  No vaginal discharge.  No focal weakness or numbness.  No sick contacts or recent travel.  She denies any history of CAD or ACS.  No history of PE or DVT.  She states that she does have a history of hyperlipidemia but she discontinued the medications due to cost.  She states that she does not have any history of hypertension or diabetes.  All pertinent positives and negatives are recorded above.  All other systems reviewed and otherwise negative.  Vital signs with diastolic hypertension but otherwise within normal limits.  Physical exam with a well-nourished well-developed female in no acute distress.  HEENT exam within normal limits.  She has no evidence of airway compromise or respiratory distress.  Abdominal exam with mild diffuse tenderness to palpation.  No rebound or guarding.  No palpable masses.  No flank pain with percussion or palpation.  She has no  gross motor, neurologic or vascular deficits on exam.  Pulses are equal bilaterally.  She is able to walk and stand without difficulty.  She is able to converse without difficulty.      EKG with sinus rhythm at 80 bpm, low voltage, normal axis, normal ST segment, normal T waves      Oral aspirin, IV Pepcid, IV morphine, IV Zofran and IV fluids      Diagnostic labs with evidence of urinary tract infection and mild leukopenia but otherwise unremarkable.      Initial Troponin T < 3.  Repeat trop < 3      Heart score 1      CT abdomen/pelvis from 16 Sept 2024 (done for the same sx)  IMPRESSION:  1. Focal density is noted within the cecum which appears adherent to  the medial aspect of the cecal wall without significant wall  thickening as described in detail above. Findings most likely  represent dense focus of stool. However, correlation with any known  colonoscopy results advised  2. Interval development of multiple phleboliths within the bilateral  gonadal veins when compared to prior examination 2019.  3. Additional findings as above.        XR chest 1 view   Final Result   Negative exam.        MACRO:   None        Signed by: Ion Cavanaugh 9/19/2024 9:30 AM   Dictation workstation:   [id number]           The patient does not have any evidence of ischemia on EKG or cardiac enzymes.  No events on telemetry.  The patient does not have any evidence of airway compromise or respiratory distress on exam.  Her abdominal exam does not reveal a surgical abdomen.  Her diagnostic labs show evidence of urinary tract infection but are otherwise unremarkable.  She is hemodynamically stable.  She is well-perfused.  She does not have any evidence of sepsis.  Chest x-ray without acute process.  No evidence of pneumonia or pneumothorax.  No evidence of CHF.  No widening of the mediastinum.  She does have equal pulses bilaterally.  The CT abdomen and pelvis from 16 September 2024 that was done for the same symptoms shows that there  is a focal density in the cecum which appears adherent to the medial aspect of the cecal wall without significant wall thickening and likely represents dense focus of stool.  She states her pain has not changed since her CT scan on 16 September.      The patient was released in good condition.  She was instructed to follow-up with her primary care physician within 1 to 2 days for further management of her current symptoms and review of the urine culture results.  She was also instructed to follow-up with her cardiologist within 1 week for further management of her chest pain.  She was also given a referral to gastroenterology for further management of the abdominal pain.  She will return to the emergency department sooner with worsening of symptoms or onset of new symptoms.  Rx given for Keflex.      Impression/diagnosis:  1.  Chest pain, left-sided  2.  Abdominal pain, left-sided  3.  Diastolic hypertension  4.  Acute lower urinary tract infection      I personally saw the patient and made/approve the management plan and take responsibility for the patient management.      I independently interpreted the following study (S) EKG and diagnostic labs      I personally discussed the patient's management with the patient      I reviewed the results of the diagnostic labs and diagnostic imaging.  Formal radiology read was completed by the radiologist.      Ban Leslie MD

## 2024-09-20 LAB — BACTERIA UR CULT: NORMAL

## 2024-09-23 ENCOUNTER — APPOINTMENT (OUTPATIENT)
Dept: PRIMARY CARE | Facility: CLINIC | Age: 59
End: 2024-09-23
Payer: COMMERCIAL

## 2024-09-23 VITALS — WEIGHT: 140 LBS | BODY MASS INDEX: 24.8 KG/M2 | HEIGHT: 63 IN

## 2024-09-23 DIAGNOSIS — Z79.899 MEDICATION MANAGEMENT: ICD-10-CM

## 2024-09-23 DIAGNOSIS — F90.0 ATTENTION DEFICIT HYPERACTIVITY DISORDER (ADHD), PREDOMINANTLY INATTENTIVE TYPE: ICD-10-CM

## 2024-09-23 DIAGNOSIS — J45.20 MILD INTERMITTENT ASTHMATIC BRONCHITIS WITHOUT COMPLICATION (HHS-HCC): ICD-10-CM

## 2024-09-23 DIAGNOSIS — I70.90 ATHEROSCLEROSIS: ICD-10-CM

## 2024-09-23 DIAGNOSIS — K58.2 IRRITABLE BOWEL SYNDROME WITH BOTH CONSTIPATION AND DIARRHEA: Primary | ICD-10-CM

## 2024-09-23 PROCEDURE — 1036F TOBACCO NON-USER: CPT | Performed by: INTERNAL MEDICINE

## 2024-09-23 PROCEDURE — 99214 OFFICE O/P EST MOD 30 MIN: CPT | Performed by: INTERNAL MEDICINE

## 2024-09-23 PROCEDURE — 3008F BODY MASS INDEX DOCD: CPT | Performed by: INTERNAL MEDICINE

## 2024-09-23 RX ORDER — HYOSCYAMINE SULFATE 0.12 MG/1
0.12 TABLET, ORALLY DISINTEGRATING ORAL EVERY 4 HOURS PRN
Qty: 60 EACH | Refills: 1 | Status: SHIPPED | OUTPATIENT
Start: 2024-09-23

## 2024-09-23 RX ORDER — LISDEXAMFETAMINE DIMESYLATE 30 MG/1
30 CAPSULE ORAL DAILY
Qty: 30 CAPSULE | Refills: 0 | Status: SHIPPED | OUTPATIENT
Start: 2024-09-23

## 2024-09-23 ASSESSMENT — PAIN SCALES - GENERAL: PAINLEVEL: 4

## 2024-09-23 ASSESSMENT — ENCOUNTER SYMPTOMS
OCCASIONAL FEELINGS OF UNSTEADINESS: 0
DEPRESSION: 0
LOSS OF SENSATION IN FEET: 0

## 2024-09-25 ENCOUNTER — APPOINTMENT (OUTPATIENT)
Dept: CARDIOLOGY | Facility: CLINIC | Age: 59
End: 2024-09-25
Payer: COMMERCIAL

## 2024-09-25 VITALS
RESPIRATION RATE: 16 BRPM | HEART RATE: 75 BPM | HEIGHT: 62 IN | BODY MASS INDEX: 25.95 KG/M2 | OXYGEN SATURATION: 97 % | DIASTOLIC BLOOD PRESSURE: 84 MMHG | SYSTOLIC BLOOD PRESSURE: 124 MMHG | WEIGHT: 141 LBS

## 2024-09-25 DIAGNOSIS — I20.89 CHRONIC STABLE ANGINA (CMS-HCC): ICD-10-CM

## 2024-09-25 DIAGNOSIS — M60.9 STATIN-INDUCED MYOSITIS: Primary | ICD-10-CM

## 2024-09-25 DIAGNOSIS — E78.5 DYSLIPIDEMIA: ICD-10-CM

## 2024-09-25 DIAGNOSIS — I70.90 ATHEROSCLEROSIS: ICD-10-CM

## 2024-09-25 DIAGNOSIS — T46.6X5A STATIN-INDUCED MYOSITIS: Primary | ICD-10-CM

## 2024-09-25 PROCEDURE — 99204 OFFICE O/P NEW MOD 45 MIN: CPT | Performed by: INTERNAL MEDICINE

## 2024-09-25 PROCEDURE — 1036F TOBACCO NON-USER: CPT | Performed by: INTERNAL MEDICINE

## 2024-09-25 PROCEDURE — 3008F BODY MASS INDEX DOCD: CPT | Performed by: INTERNAL MEDICINE

## 2024-09-25 ASSESSMENT — LIFESTYLE VARIABLES
HAS A RELATIVE, FRIEND, DOCTOR, OR ANOTHER HEALTH PROFESSIONAL EXPRESSED CONCERN ABOUT YOUR DRINKING OR SUGGESTED YOU CUT DOWN: NO
SKIP TO QUESTIONS 9-10: 1
HOW OFTEN DO YOU HAVE SIX OR MORE DRINKS ON ONE OCCASION: NEVER
HOW MANY STANDARD DRINKS CONTAINING ALCOHOL DO YOU HAVE ON A TYPICAL DAY: PATIENT DOES NOT DRINK
HOW OFTEN DO YOU HAVE A DRINK CONTAINING ALCOHOL: NEVER
AUDIT TOTAL SCORE: 0
AUDIT-C TOTAL SCORE: 0
HAVE YOU OR SOMEONE ELSE BEEN INJURED AS A RESULT OF YOUR DRINKING: NO

## 2024-09-25 ASSESSMENT — ENCOUNTER SYMPTOMS
DEPRESSION: 0
LOSS OF SENSATION IN FEET: 0
OCCASIONAL FEELINGS OF UNSTEADINESS: 0

## 2024-09-25 ASSESSMENT — PATIENT HEALTH QUESTIONNAIRE - PHQ9
SUM OF ALL RESPONSES TO PHQ9 QUESTIONS 1 AND 2: 0
2. FEELING DOWN, DEPRESSED OR HOPELESS: NOT AT ALL
1. LITTLE INTEREST OR PLEASURE IN DOING THINGS: NOT AT ALL

## 2024-09-25 ASSESSMENT — PAIN SCALES - GENERAL: PAINLEVEL: 0-NO PAIN

## 2024-09-25 NOTE — PROGRESS NOTES
Subjective   Chief Complaint   Patient presents with    Hospital Follow-up     Pt here today for ER follow up 2nd to Chest pain. Requesting CT result. No other complaints at this time.      59-year female patient with a history of ADD, low B12 in the past history of hyperlipidemia in the past.  Patient had history of myositis with a statin therapy.  Recently went to emergency room with episode of abdominal pain as well as rib cage pain.  Patient now referred here for vascular as well as cardiac evaluation.  EKG shows a sinus rhythm with a nonspecific ST-T changes seen.  EKG showed essentially sinus arrhythmia with patient PVC voltage QRS with a nonspecific ST-T change and possible inferior infarct.  Rate about 78 bpm.  Chest x-ray was done essentially was unremarkable.  Patient had a CT abdomen pelvis was done essentially showed moderate atherosclerotic calcification of aorta.  Patient currently on Repatha for hyperlipidemia.  Comprehensive metabolic panel done recently about 6 days ago was unremarkable.  CBC also was unremarkable.  Patient had a lipid profile done 6 months ago essentially show LDL is 104 And triglyceride 147.  Past Medical History:   Diagnosis Date    Chronic myringitis, right ear 01/23/2017    Chronic myringitis of right ear    Other enthesopathy of unspecified foot and ankle 10/16/2015    Foot tendinitis    Otitis media, unspecified, right ear 12/08/2017    Acute right otitis media    Otitis media, unspecified, right ear 03/14/2017    Right acute otitis media    Pain in left leg 10/27/2015    Lower extremity pain, posterior, left    Pain in unspecified ankle and joints of unspecified foot 05/23/2016    Ankle pain    Personal history of other diseases of the nervous system and sense organs     History of perforation of tympanic membrane    Personal history of other diseases of the respiratory system 11/02/2017    History of acute sinusitis    Personal history of other diseases of the respiratory  system 12/08/2017    History of acute bronchitis with bronchospasm    Personal history of other diseases of the respiratory system 03/14/2017    History of acute bronchitis with bronchospasm    Personal history of other diseases of the respiratory system 01/20/2020    History of acute bacterial sinusitis    Personal history of other diseases of the respiratory system 08/10/2015    History of bronchitis    Personal history of other diseases of the respiratory system 08/10/2015    History of sinusitis    Personal history of other specified conditions 07/27/2016    History of headache    Personal history of other specified conditions 03/30/2016    History of diarrhea    Unspecified acute conjunctivitis, right eye 11/02/2017    Acute conjunctivitis of right eye, unspecified acute conjunctivitis type    Unspecified chronic otitis externa, unspecified ear 12/04/2015    Chronic otitis externa     Past Surgical History:   Procedure Laterality Date    OTHER SURGICAL HISTORY  01/29/2014    Ear Surgery    OTHER SURGICAL HISTORY  12/19/2017    Oophorectomy - Bilat (Removal Of Both Ovaries) Laparoscopic     No relevant family history has been documented for this patient.  Current Outpatient Medications   Medication Sig Dispense Refill    ALPRAZolam (Xanax) 0.5 mg tablet Take 1 tablet (0.5 mg) by mouth 2 times a day. 60 tablet 0    cephalexin (Keflex) 500 mg capsule Take 1 capsule (500 mg) by mouth 2 times a day for 7 days. 14 capsule 0    cholecalciferol (Vitamin D-3) 50,000 unit capsule Take 1 capsule (50,000 Units) by mouth 1 (one) time per week. 4 capsule 3    evolocumab (Repatha SureClick) 140 mg/mL injection Inject 1 mL (140 mg) under the skin every 14 (fourteen) days.      hyoscyamine (Levsin/SL) 0.125 mg disintegrating tablet Take 1 tablet (0.125 mg) by mouth every 4 hours if needed (abdominal pain spasm). 60 each 1    lisdexamfetamine (Vyvanse) 30 mg capsule Take 1 capsule (30 mg) by mouth once daily. 30 capsule 0     nitrofurantoin, macrocrystal-monohydrate, (Macrobid) 100 mg capsule Take 1 capsule (100 mg) by mouth every 12 hours. 30 capsule 1    albuterol 90 mcg/actuation inhaler Inhale 2 puffs.      chlorhexidine (Peridex) 0.12 % solution RINSE MOUTH WITH 15ML FOR 30 SECONDS in the AM AND Pm after brushing teeth. spit  AFTER RINSING, DO NOT SWALLOW      estradiol (Vagifem) 10 mcg tablet vaginal tablet Insert 1 tablet (10 mcg) into the vagina.      estrogens, conjugated, (Premarin) vaginal cream 0.5 gm Vaginal twice weekly for 90 days      evolocumab (Repatha SureClick) 140 mg/mL injection Inject 1 mL (140 mg) under the skin every 14 (fourteen) days. 6 mL 3    ezetimibe (Zetia) 10 mg tablet Take 1 tablet (10 mg) by mouth once daily. (Patient not taking: Reported on 9/25/2024) 30 tablet 11    ezetimibe (Zetia) 10 mg tablet Take 1 tablet (10 mg) by mouth once daily. (Patient not taking: Reported on 9/25/2024) 30 tablet 11     No current facility-administered medications for this visit.      reports that she has quit smoking. Her smoking use included cigarettes. She has never used smokeless tobacco. She reports that she does not drink alcohol and does not use drugs.  Allergies:  Sulfa (sulfonamide antibiotics), Sulfamethoxazole-trimethoprim, and Sulfone    ROS: See HPI  CONSTITUTIONAL: Chills- none. Fever- none. Weight change appropriate for age.  HEENT: Headache- Negative.  Change in vision- none.  Ear pain- none. Nasal congestion- none. Post-nasal drip-none.  Sore throat-none.  CARDIOLOGY: Chest pain- none.  Leg edema-trace.  Murmurs-soft systolic.  Palpitation- none.  RESPIRATORY: Denies any shortness of breath.  GI: Abdominal pain- none.  Change in bowel habits- none.  Constipation- none.  Diarrhea- none.  Nausea- none.  Vomiting- none.  MUSCULOSKELETAL: Joint pain- none.  Muscle aches- none.  DERMATOLOGY: Rash- none.  NEUROLOGY: Dizziness- none.   Headache- none.  PSYCHIATRY: Denies any depression or anxiety     Vitals:     "09/25/24 1313   BP: 124/84   Pulse: 75   Resp: 16   SpO2: 97%   Weight: 64 kg (141 lb)   Height: 1.575 m (5' 2\")   PainSc: 0-No pain      BMI:Body mass index is 25.79 kg/m².   General Cardiology:  General Appearance: Alert, oriented and in no acute distress.  HEENT: extra ocular movements intact (EOMI), pupils equal,  round, reactive to light and accommodation (PERRLA).  Carotid Upstroke: no bruit, normal.  Jugular Venous Distention (JVD): flat.  Chest: normal.  Lungs: Clear to auscultation,   Heart Sounds: no S3 or S4, normal S1, S2, regular rate.  Murmur, Click, Gallop: no systolic murmur.  Abdomen: no hepatomegaly, no masses felt, soft.  Extremities: no leg edema.  Peripheral pulses: 2 plus bilateral.  NEUROLOGY Cranial nerves II-XII grossly intact.     Patient Active Problem List   Diagnosis    Abdominal pain    Acquired pes planus of left foot    Asthmatic bronchitis (HHS-HCC)    ADD (attention deficit disorder)    Allergy    Anxiety and depression    Anxiety    Atypical chest pain    Conductive hearing loss in right ear    Conductive hearing loss of right ear with unrestricted hearing of left ear    Hearing loss, conductive    Deviated nasal septum    Diarrhea    Dyslipidemia    Edema of right ankle    Facial pressure    GERD (gastroesophageal reflux disease)    Hypercholesterolemia    Impacted cerumen of both ears    Impacted cerumen of right ear    Low back pain    Low TSH level    Metatarsalgia of left foot    Mix cndct/snrl hear loss,uni,r ear,w unrestr hear cntra side    Mixed conductive and sensorineural hearing loss of right ear with unrestricted hearing of left ear    MRSA infection (methicillin-resistant Staphylococcus aureus)    Multiple thyroid nodules    Muscle cramps    Earache    Muscle pain, fibromyalgia    Myringitis, chronic    Nasal vestibulitis    Persistent cough    Recurrent dry cough    Recurrent nonproductive cough    Opioid dependence (Multi)    Osteoporosis    Otalgia of both ears    " Otalgia of right ear    Post-nasal drip    Rib pain on left side    Scabies infestation    Seasonal allergies    Shingles (herpes zoster) polyneuropathy    Shortness of breath    Sinus congestion    Stress fracture of tarsal bone of left foot    Sore throat    Thyroid nodule    Tiredness    TMJ crepitus    Urinary tract infection    Vaginal yeast infection    Viral illness    Vitamin D deficiency    Xanthelasma    Metatarsal stress fracture of left foot    Dermatitis    Cough    Anticipatory anxiety    Spondylosis, lumbosacral    Statin-induced myositis    Acute bronchitis    Acute maxillary sinusitis    High coronary artery calcium score    Acute non-recurrent frontal sinusitis    Irritable bowel syndrome with both constipation and diarrhea    Atherosclerosis    Chronic stable angina (CMS-HCC)       Problem List Items Addressed This Visit       Dyslipidemia    Relevant Orders    Nuclear Stress Test    Statin-induced myositis - Primary    Relevant Orders    Nuclear Stress Test    Atherosclerosis    Chronic stable angina (CMS-HCC)      59-year-old female patient with a known history of hyperlipidemia currently on Repatha.  CT of abdomen essentially showed atherosclerotic abdominal aorta.  On a left-sided chest pressure tightness symptoms.  Workup was negative recently in the ER visit.  1.  Chronic stable angina: Will schedule patient for exercise nuclear stress test.  2.  Hyperlipidemia: Continue current Repatha injection.  Order also lipid profile.  3.  Vascular atherosclerosis: Patient has upcoming appointment with the vascular surgeon.    Advised patient to avoid lunch meats, canned soups, pizzas, bread rolls, and sandwiches. Advised patient to limit salt intake 1,500 mg daily. Advised patient to exercise 30 mins/3 times a week including treadmill or aerobic type, Goal to achieve 65% target HR.    Diet and exercise reviewed with patient..advice to walk about 10,000 steps or about 2 hours during day time. Cut back  on salt, sugar and flour.    Pt. care time is spent includes review of diagnostic tests, labs, radiographs, EKGs, old echoes, cardiac work-up and coordination of care. Assessment, impression and plans are reflected in the note above as well as the orders.    Chito Mosley MD

## 2024-09-27 LAB — CARBOXYTHC UR-MCNC: 24 NG/ML

## 2024-09-30 LAB
1OH-MIDAZOLAM UR CFM-MCNC: <25 NG/ML
7AMINOCLONAZEPAM UR CFM-MCNC: <25 NG/ML
A-OH ALPRAZ UR CFM-MCNC: 778 NG/ML
ALPRAZ UR CFM-MCNC: 130 NG/ML
CHLORDIAZEP UR CFM-MCNC: <25 NG/ML
CLONAZEPAM UR CFM-MCNC: <25 NG/ML
DIAZEPAM UR CFM-MCNC: <25 NG/ML
LORAZEPAM UR CFM-MCNC: <25 NG/ML
MIDAZOLAM UR CFM-MCNC: <25 NG/ML
NORDIAZEPAM UR CFM-MCNC: <25 NG/ML
OXAZEPAM UR CFM-MCNC: <25 NG/ML
TEMAZEPAM UR CFM-MCNC: <25 NG/ML

## 2024-10-01 ENCOUNTER — LAB (OUTPATIENT)
Dept: LAB | Facility: LAB | Age: 59
End: 2024-10-01
Payer: COMMERCIAL

## 2024-10-01 DIAGNOSIS — E78.5 DYSLIPIDEMIA: ICD-10-CM

## 2024-10-01 DIAGNOSIS — M60.9 STATIN-INDUCED MYOSITIS: ICD-10-CM

## 2024-10-01 DIAGNOSIS — R63.4 ABNORMAL WEIGHT LOSS: Primary | ICD-10-CM

## 2024-10-01 DIAGNOSIS — I70.90 ATHEROSCLEROSIS: ICD-10-CM

## 2024-10-01 DIAGNOSIS — T46.6X5A STATIN-INDUCED MYOSITIS: ICD-10-CM

## 2024-10-01 DIAGNOSIS — I20.89 CHRONIC STABLE ANGINA (CMS-HCC): ICD-10-CM

## 2024-10-01 PROCEDURE — 36415 COLL VENOUS BLD VENIPUNCTURE: CPT

## 2024-10-01 PROCEDURE — 84443 ASSAY THYROID STIM HORMONE: CPT

## 2024-10-02 LAB — TSH SERPL-ACNC: 0.8 MIU/L (ref 0.44–3.98)

## 2024-10-15 DIAGNOSIS — F41.9 ANXIETY: ICD-10-CM

## 2024-10-15 RX ORDER — ALPRAZOLAM 0.5 MG/1
0.5 TABLET ORAL 2 TIMES DAILY
Qty: 60 TABLET | Refills: 0 | Status: SHIPPED | OUTPATIENT
Start: 2024-10-15

## 2024-10-22 DIAGNOSIS — F90.0 ATTENTION DEFICIT HYPERACTIVITY DISORDER (ADHD), PREDOMINANTLY INATTENTIVE TYPE: ICD-10-CM

## 2024-10-22 RX ORDER — LISDEXAMFETAMINE DIMESYLATE 30 MG/1
30 CAPSULE ORAL DAILY
Qty: 30 CAPSULE | Refills: 0 | Status: SHIPPED | OUTPATIENT
Start: 2024-10-22

## 2024-11-13 DIAGNOSIS — F41.9 ANXIETY: ICD-10-CM

## 2024-11-14 RX ORDER — ALPRAZOLAM 0.5 MG/1
0.5 TABLET ORAL 2 TIMES DAILY
Qty: 60 TABLET | Refills: 0 | Status: SHIPPED | OUTPATIENT
Start: 2024-11-14

## 2024-11-21 DIAGNOSIS — F90.0 ATTENTION DEFICIT HYPERACTIVITY DISORDER (ADHD), PREDOMINANTLY INATTENTIVE TYPE: ICD-10-CM

## 2024-11-21 RX ORDER — LISDEXAMFETAMINE DIMESYLATE 30 MG/1
30 CAPSULE ORAL DAILY
Qty: 30 CAPSULE | Refills: 0 | Status: SHIPPED | OUTPATIENT
Start: 2024-11-21

## 2024-12-09 ENCOUNTER — HOSPITAL ENCOUNTER (OUTPATIENT)
Dept: RADIOLOGY | Facility: HOSPITAL | Age: 59
Discharge: HOME | End: 2024-12-09
Payer: COMMERCIAL

## 2024-12-09 ENCOUNTER — HOSPITAL ENCOUNTER (OUTPATIENT)
Dept: CARDIOLOGY | Facility: HOSPITAL | Age: 59
Discharge: HOME | End: 2024-12-09
Payer: COMMERCIAL

## 2024-12-09 DIAGNOSIS — M60.9 STATIN-INDUCED MYOSITIS: ICD-10-CM

## 2024-12-09 DIAGNOSIS — T46.6X5A STATIN-INDUCED MYOSITIS: ICD-10-CM

## 2024-12-09 DIAGNOSIS — E78.5 DYSLIPIDEMIA: ICD-10-CM

## 2024-12-09 PROCEDURE — A9502 TC99M TETROFOSMIN: HCPCS | Performed by: INTERNAL MEDICINE

## 2024-12-09 PROCEDURE — 78452 HT MUSCLE IMAGE SPECT MULT: CPT

## 2024-12-09 PROCEDURE — 93017 CV STRESS TEST TRACING ONLY: CPT

## 2024-12-09 PROCEDURE — 93018 CV STRESS TEST I&R ONLY: CPT | Performed by: INTERNAL MEDICINE

## 2024-12-09 PROCEDURE — 3430000001 HC RX 343 DIAGNOSTIC RADIOPHARMACEUTICALS: Performed by: INTERNAL MEDICINE

## 2024-12-09 PROCEDURE — 78452 HT MUSCLE IMAGE SPECT MULT: CPT | Performed by: STUDENT IN AN ORGANIZED HEALTH CARE EDUCATION/TRAINING PROGRAM

## 2024-12-09 PROCEDURE — 93016 CV STRESS TEST SUPVJ ONLY: CPT | Performed by: INTERNAL MEDICINE

## 2024-12-13 ENCOUNTER — TELEPHONE (OUTPATIENT)
Dept: CARDIOLOGY | Facility: CLINIC | Age: 59
End: 2024-12-13
Payer: COMMERCIAL

## 2024-12-13 DIAGNOSIS — F41.9 ANXIETY: ICD-10-CM

## 2024-12-13 RX ORDER — ALPRAZOLAM 0.5 MG/1
0.5 TABLET ORAL 2 TIMES DAILY
Qty: 60 TABLET | Refills: 0 | Status: SHIPPED | OUTPATIENT
Start: 2024-12-13

## 2024-12-13 NOTE — TELEPHONE ENCOUNTER
Pt stress test will need to be appeal due to the denial given on 12.09.24 the same day it was ordered. Per cert states they have sent the information to you concerning the matter, this is the first and original order. Pt has not had the testing done due to denial.

## 2024-12-16 DIAGNOSIS — E55.9 VITAMIN D INSUFFICIENCY: ICD-10-CM

## 2024-12-16 RX ORDER — ASPIRIN 325 MG
50000 TABLET, DELAYED RELEASE (ENTERIC COATED) ORAL
Qty: 4 CAPSULE | Refills: 3 | Status: SHIPPED | OUTPATIENT
Start: 2024-12-22 | End: 2025-12-22

## 2024-12-17 ENCOUNTER — TELEPHONE (OUTPATIENT)
Dept: PRIMARY CARE | Facility: CLINIC | Age: 59
End: 2024-12-17
Payer: COMMERCIAL

## 2024-12-17 ENCOUNTER — TELEPHONE (OUTPATIENT)
Dept: CARDIOLOGY | Facility: CLINIC | Age: 59
End: 2024-12-17
Payer: COMMERCIAL

## 2024-12-17 NOTE — TELEPHONE ENCOUNTER
Patient called in concerned about some things that were mentioned in her stress test report. She was concerned about the PVC's and tachycardia that happened after 130 bpm. She would like to know if that is an issue and if she should not exercise (she feels bad after reaching that hr while exercising). She also mentioned the MRI she had in September and how it showed the plaque in her abdominal aorta and she wants to know what the next steps are now that she has had the stress test.

## 2024-12-23 DIAGNOSIS — F90.0 ATTENTION DEFICIT HYPERACTIVITY DISORDER (ADHD), PREDOMINANTLY INATTENTIVE TYPE: ICD-10-CM

## 2024-12-24 RX ORDER — LISDEXAMFETAMINE DIMESYLATE 30 MG/1
30 CAPSULE ORAL DAILY
Qty: 30 CAPSULE | Refills: 0 | Status: SHIPPED | OUTPATIENT
Start: 2024-12-24

## 2025-01-10 DIAGNOSIS — F41.9 ANXIETY: ICD-10-CM

## 2025-01-10 RX ORDER — ALPRAZOLAM 0.5 MG/1
0.5 TABLET ORAL 2 TIMES DAILY
Qty: 60 TABLET | Refills: 0 | Status: SHIPPED | OUTPATIENT
Start: 2025-01-10

## 2025-01-23 DIAGNOSIS — F90.0 ATTENTION DEFICIT HYPERACTIVITY DISORDER (ADHD), PREDOMINANTLY INATTENTIVE TYPE: ICD-10-CM

## 2025-01-23 RX ORDER — LISDEXAMFETAMINE DIMESYLATE 30 MG/1
30 CAPSULE ORAL DAILY
Qty: 30 CAPSULE | Refills: 0 | Status: SHIPPED | OUTPATIENT
Start: 2025-01-23 | End: 2025-01-23 | Stop reason: SDUPTHER

## 2025-01-23 RX ORDER — LISDEXAMFETAMINE DIMESYLATE 30 MG/1
30 CAPSULE ORAL DAILY
Qty: 30 CAPSULE | Refills: 0 | Status: SHIPPED | OUTPATIENT
Start: 2025-01-23

## 2025-02-04 NOTE — PROGRESS NOTES
Subjective   Donna Emery is a 59 y.o. female who presents for ed follow.    HPI  Donna is 59 with a history of ADD low B12 familial hyperlipidemia severe dyslipidemia on multiple medications including Repatha (cannot afford due to price) ezetimibe hx statin induced myositis.  Post ED follow with Cardiology Stable angina, Rare SL NTG, on Rapatha for dyslipidemia, Statin myositis, Nuclear stress test .    Review of Systems  10 system review pertinent as above  Objective     There were no vitals taken for this visit.   Physical Exam  HEENT: Atraumatic normocephalic the pupils are equal and round and reactive to light the sclerae nonicteric extraocular motion are intact.  Neck: Is supple without JVD no carotid bruits the trachea is midline there are no masses pulses are equal and bilateral with normal upstroke.  Skin: Normal.  Skin good texture.  Moist.  Good turgor.  No lesions, no rashes.  Lymph: No lymphadenopathy appreciated, no masses, no lesions  Lungs: Are clear to auscultation and percussion, good breath sounds bilaterally, no rhonchi, no wheezing, good diaphragmatic excursion.  Heart: Normal rate and normal rhythm S1, S2, no S3, no gallop, murmur or rub.  Abdomen: Soft, nontender, no organomegaly, good bowel sounds.    Extremities: Full range of motion, good pulses bilateral.  No cyanosis, no clubbing or edema.  Neuro: Cranial nerves II-XII are grossly intact there is no sensory or motor deficits.  Able to move all extremities.    Assessment/Plan     ED visit  September 19, 2024  Discharge September 19, 2024  CT abdomen pelvis Negative  Will see GI for Upper and lower   Will see cardiology Dr Mosley [facility] Wed this week    12/09/2024 Nuclear stress test   Negative myocardial perfusion study without evidence of inducible   myocardial ischemia or prior infarction.   The left ventricle is normal in size.   Normal LV wall motion with a post-stress LV EF estimated greater   than 65%.     Lower back  pain  Lumbar Spinal stenosis  Declined Surgical    IBS abdomina  Levsin prn  Fiber and fluid  Dr Mcwilliams GI Consult    Acute Cystitis  Complete ATB    Atherosclerosis  Will see Vascular surgery at [facility]  Dr Peterson for atherosclerosis    Workup including high-sensitivity troponin negative  Imaging currently x-ray chest essentially negative  ECG 12-lead reported that nonacute occasional PVCs    Low-fat, low-cholesterol diet.  I recommended Mediterranean diet  Including fish, chicken, vegetables and olive oil  Exercise daily for 30 minutes  Extremely elevated LDL  Continue medications as prescribed  Cannot afford Repatha  Severe statin induced myositis  Cannot use statins    ADD  On Vyvanse  Doing quite well able to concentrate  Able to hold a full-time job  Without patient cannot complete tasks    Severe anxiety with panic  On alprazolam twice daily  Which patient ends up going to the emergency room multiple times  She takes her medication responsibly as prescribed without side effects to report    OARRS:  No data recorded  I have personally reviewed the OARRS report for Donna MICHEL Yuly. I have considered the risks of abuse, dependence, addiction and diversion    Is the patient prescribed a combination of a benzodiazepine and opioid?  No    Last Urine Drug Screen / ordered today: Yes  Recent Results (from the past 8760 hours)   Benzodiazepine Confirmation, Urine    Collection Time: 09/23/24 12:27 PM   Result Value Ref Range    Clonazepam <25 <25 ng/mL    7-Aminoclonazepam <25 <25 ng/mL    Alprazolam 130 (H) <25 ng/mL    Alpha-Hydroxyalprazolam 778 (H) <25 ng/mL    Midazolam <25 <25 ng/mL    Alpha-Hydroxymidazolam <25 <25 ng/mL    Chlordiazepoxide <25 <25 ng/mL    Diazepam <25 <25 ng/mL    Nordiazepam <25 <25 ng/mL    Temazepam <25 <25 ng/mL    Oxazepam <25 <25 ng/mL    Lorazepam <25 <25 ng/mL   Amphetamine Confirm, Urine    Collection Time: 09/23/24 12:27 PM   Result Value Ref Range    Methamphetamine Quant, Ur  <200 ng/mL    MDA, Urine <200 ng/mL    MDEA, Urine <200 ng/mL    Phentermine,Urine <200 ng/mL    Amphetamines,Urine >5000 ng/mL    MDMA, Urine <200 ng/mL   Drug Screen, Urine With Reflex to Confirmation    Collection Time: 09/23/24 12:27 PM   Result Value Ref Range    Amphetamine Screen, Urine Presumptive Positive (A) Presumptive Negative    Barbiturate Screen, Urine Presumptive Negative Presumptive Negative    Benzodiazepines Screen, Urine Presumptive Positive (A) Presumptive Negative    Cannabinoid Screen, Urine Presumptive Positive (A) Presumptive Negative    Cocaine Metabolite Screen, Urine Presumptive Negative Presumptive Negative    Fentanyl Screen, Urine Presumptive Negative Presumptive Negative    Opiate Screen, Urine Presumptive Negative Presumptive Negative    Oxycodone Screen, Urine Presumptive Negative Presumptive Negative    PCP Screen, Urine Presumptive Negative Presumptive Negative    Methadone Screen, Urine Presumptive Negative Presumptive Negative   Opiate Confirmation, Urine    Collection Time: 03/12/24 10:35 AM   Result Value Ref Range    6-Acetylmorphine <25 <25 ng/mL    Codeine <50 <50 ng/mL    Hydrocodone <25 <25 ng/mL    Hydromorphone <25 <25 ng/mL    Morphine  370 (H) <50 ng/mL    Norhydrocodone <25 <25 ng/mL    Noroxycodone <25 <25 ng/mL    Oxycodone <25 <25 ng/mL    Oxymorphone <25 <25 ng/mL     Results are as expected.         Controlled Substance Agreement:  Date of the Last Agreement: 08/2023  Reviewed Controlled Substance Agreement including but not limited to the benefits, risks, and alternatives to treatment with a Controlled Substance medication(s).    Benzodiazepines:  What is the patient's goal of therapy?  Anxiety with panic  Is this being achieved with current treatment?  Yes    CHEKO-7:  No data recorded      Activities of Daily Living:   Is your overall impression that this patient is benefiting (symptom reduction outweighs side effects) from benzodiazepine therapy? Yes     1.  Physical Functioning: Better  2. Family Relationship: Better  3. Social Relationship: Better  4. Mood: Better  5. Sleep Patterns: Better  6. Overall Function: Better and Stimulants:   What is the patient's goal of therapy?  Attention deficit disorder  Is this being achieved with current treatment? yes    Activities of Daily Living:   Is your overall impression that this patient is benefiting (symptom reduction outweighs side effects) from stimulant therapy? Yes     1. Physical Functioning: Better  2. Family Relationship: Better  3. Social Relationship: Better  4. Mood: Better  5. Sleep Patterns: Better  6. Overall Function: Better          Problem List Items Addressed This Visit    None            Sravan New MD

## 2025-02-05 ENCOUNTER — APPOINTMENT (OUTPATIENT)
Dept: PRIMARY CARE | Facility: CLINIC | Age: 60
End: 2025-02-05
Payer: COMMERCIAL

## 2025-02-05 VITALS — BODY MASS INDEX: 25.76 KG/M2 | HEIGHT: 62 IN | WEIGHT: 140 LBS

## 2025-02-05 DIAGNOSIS — F11.21 OPIOID DEPENDENCE IN REMISSION (MULTI): ICD-10-CM

## 2025-02-05 DIAGNOSIS — I70.90 ATHEROSCLEROSIS OF ARTERIES: Primary | ICD-10-CM

## 2025-02-05 DIAGNOSIS — M48.07 SPINAL STENOSIS OF LUMBOSACRAL REGION: ICD-10-CM

## 2025-02-05 DIAGNOSIS — J45.20 MILD INTERMITTENT ASTHMATIC BRONCHITIS WITHOUT COMPLICATION (HHS-HCC): ICD-10-CM

## 2025-02-05 PROBLEM — E04.2 MULTIPLE THYROID NODULES: Status: RESOLVED | Noted: 2023-03-10 | Resolved: 2025-02-05

## 2025-02-05 PROBLEM — F11.20 OPIOID DEPENDENCE: Status: RESOLVED | Noted: 2023-03-10 | Resolved: 2025-02-05

## 2025-02-05 PROBLEM — M79.7 MUSCLE PAIN, FIBROMYALGIA: Status: RESOLVED | Noted: 2023-03-10 | Resolved: 2025-02-05

## 2025-02-05 PROBLEM — B02.23 SHINGLES (HERPES ZOSTER) POLYNEUROPATHY: Status: RESOLVED | Noted: 2023-03-10 | Resolved: 2025-02-05

## 2025-02-05 PROBLEM — H90.2 HEARING LOSS, CONDUCTIVE: Status: RESOLVED | Noted: 2023-03-10 | Resolved: 2025-02-05

## 2025-02-05 PROBLEM — F32.A ANXIETY AND DEPRESSION: Status: RESOLVED | Noted: 2023-03-10 | Resolved: 2025-02-05

## 2025-02-05 PROBLEM — H61.23 IMPACTED CERUMEN OF BOTH EARS: Status: RESOLVED | Noted: 2023-03-10 | Resolved: 2025-02-05

## 2025-02-05 PROBLEM — R53.83 TIREDNESS: Status: RESOLVED | Noted: 2023-03-10 | Resolved: 2025-02-05

## 2025-02-05 PROBLEM — F41.9 ANXIETY AND DEPRESSION: Status: RESOLVED | Noted: 2023-03-10 | Resolved: 2025-02-05

## 2025-02-05 PROBLEM — K21.9 GERD (GASTROESOPHAGEAL REFLUX DISEASE): Status: RESOLVED | Noted: 2023-03-10 | Resolved: 2025-02-05

## 2025-02-05 PROBLEM — M21.42 ACQUIRED PES PLANUS OF LEFT FOOT: Status: RESOLVED | Noted: 2023-03-10 | Resolved: 2025-02-05

## 2025-02-05 PROCEDURE — 3008F BODY MASS INDEX DOCD: CPT | Performed by: INTERNAL MEDICINE

## 2025-02-05 PROCEDURE — 99214 OFFICE O/P EST MOD 30 MIN: CPT | Performed by: INTERNAL MEDICINE

## 2025-02-05 ASSESSMENT — ENCOUNTER SYMPTOMS
OCCASIONAL FEELINGS OF UNSTEADINESS: 0
LOSS OF SENSATION IN FEET: 0
DEPRESSION: 0

## 2025-02-05 ASSESSMENT — PAIN SCALES - GENERAL: PAINLEVEL_OUTOF10: 6

## 2025-02-10 ENCOUNTER — TELEPHONE (OUTPATIENT)
Dept: PRIMARY CARE | Facility: CLINIC | Age: 60
End: 2025-02-10
Payer: COMMERCIAL

## 2025-02-10 DIAGNOSIS — F41.9 ANXIETY: ICD-10-CM

## 2025-02-10 RX ORDER — ALPRAZOLAM 0.5 MG/1
0.5 TABLET ORAL 2 TIMES DAILY
Qty: 60 TABLET | Refills: 0 | Status: SHIPPED | OUTPATIENT
Start: 2025-02-10

## 2025-02-21 DIAGNOSIS — F41.9 ANXIETY: ICD-10-CM

## 2025-02-21 RX ORDER — ALPRAZOLAM 0.5 MG/1
0.5 TABLET ORAL 2 TIMES DAILY
Qty: 60 TABLET | Refills: 0 | Status: CANCELLED | OUTPATIENT
Start: 2025-02-21

## 2025-02-25 ENCOUNTER — TELEPHONE (OUTPATIENT)
Dept: PRIMARY CARE | Facility: CLINIC | Age: 60
End: 2025-02-25
Payer: COMMERCIAL

## 2025-02-25 DIAGNOSIS — F90.0 ATTENTION DEFICIT HYPERACTIVITY DISORDER (ADHD), PREDOMINANTLY INATTENTIVE TYPE: ICD-10-CM

## 2025-02-25 RX ORDER — LISDEXAMFETAMINE DIMESYLATE 30 MG/1
30 CAPSULE ORAL DAILY
Qty: 30 CAPSULE | Refills: 0 | Status: SHIPPED | OUTPATIENT
Start: 2025-02-25 | End: 2025-02-26 | Stop reason: SDUPTHER

## 2025-02-26 RX ORDER — LISDEXAMFETAMINE DIMESYLATE 30 MG/1
30 CAPSULE ORAL DAILY
Qty: 30 CAPSULE | Refills: 0 | Status: SHIPPED | OUTPATIENT
Start: 2025-02-26

## 2025-03-10 NOTE — PROGRESS NOTES
Dinora Emery is a 59 y.o. female who presents for urine drug screen and face to face.    HPI  Donna is 59 with a history of ADD low B12 familial hyperlipidemia severe dyslipidemia on multiple medications including Repatha (cannot afford due to price) ezetimibe hx statin induced myositis.  Post ED follow with Cardiology Stable angina, Rare SL NTG, on Rapatha for dyslipidemia, Statin myositis, Nuclear stress test .    Review of Systems  10 system review pertinent as above  Objective     There were no vitals taken for this visit.   Physical Exam  HEENT: Atraumatic normocephalic the pupils are equal and round and reactive to light the sclerae nonicteric extraocular motion are intact.  Neck: Is supple without JVD no carotid bruits the trachea is midline there are no masses pulses are equal and bilateral with normal upstroke.  Skin: Normal.  Skin good texture.  Moist.  Good turgor.  No lesions, no rashes.  Lymph: No lymphadenopathy appreciated, no masses, no lesions  Lungs: Are clear to auscultation and percussion, good breath sounds bilaterally, no rhonchi, no wheezing, good diaphragmatic excursion.  Heart: Normal rate and normal rhythm S1, S2, no S3, no gallop, murmur or rub.  Abdomen: Soft, nontender, no organomegaly, good bowel sounds.    Extremities: Full range of motion, good pulses bilateral.  No cyanosis, no clubbing or edema.  Neuro: Cranial nerves II-XII are grossly intact there is no sensory or motor deficits.  Able to move all extremities.    Assessment/Plan     ED visit  September 19, 2024  Discharge September 19, 2024  CT abdomen pelvis Negative  Will see GI for Upper and lower   Will see cardiology Dr Mosley [facility] Wed this week    12/09/2024 Nuclear stress test   Negative myocardial perfusion study without evidence of inducible   myocardial ischemia or prior infarction.   The left ventricle is normal in size.   Normal LV wall motion with a post-stress LV EF estimated greater   than 65%.      Lower back pain  Lumbar Spinal stenosis  Declined Surgical    IBS abdomina  Levsin prn  Fiber and fluid  Dr Mcwilliams GI Consult    Acute Cystitis  Complete ATB    Atherosclerosis  Will see Vascular surgery at [facility]  Dr Peterson for atherosclerosis    Workup including high-sensitivity troponin negative  Imaging currently x-ray chest essentially negative  ECG 12-lead reported that nonacute occasional PVCs    Low-fat, low-cholesterol diet.  I recommended Mediterranean diet  Including fish, chicken, vegetables and olive oil  Exercise daily for 30 minutes  Extremely elevated LDL  Continue medications as prescribed  Cannot afford Repatha  Severe statin induced myositis  Cannot use statins  Nuclear stress test  Negative myocardial perfusion study without evidence of inducible  myocardial ischemia or prior infarctio    ADD  On Vyvanse  Doing quite well able to concentrate  Able to hold a full-time job  Without patient cannot complete tasks    Severe anxiety with panic  On alprazolam twice daily  Which patient ends up going to the emergency room multiple times  She takes her medication responsibly as prescribed without side effects to report    OARRS:  Sravan New MD on 3/12/2025 12:04 PM  I have personally reviewed the OARRS report for Donna Emery. I have considered the risks of abuse, dependence, addiction and diversion    Is the patient prescribed a combination of a benzodiazepine and opioid?  No    Last Urine Drug Screen / ordered today: Yes  Recent Results (from the past 8760 hours)   Benzodiazepine Confirmation, Urine    Collection Time: 09/23/24 12:27 PM   Result Value Ref Range    Clonazepam <25 <25 ng/mL    7-Aminoclonazepam <25 <25 ng/mL    Alprazolam 130 (H) <25 ng/mL    Alpha-Hydroxyalprazolam 778 (H) <25 ng/mL    Midazolam <25 <25 ng/mL    Alpha-Hydroxymidazolam <25 <25 ng/mL    Chlordiazepoxide <25 <25 ng/mL    Diazepam <25 <25 ng/mL    Nordiazepam <25 <25 ng/mL    Temazepam <25 <25 ng/mL    Oxazepam  <25 <25 ng/mL    Lorazepam <25 <25 ng/mL   Amphetamine Confirm, Urine    Collection Time: 09/23/24 12:27 PM   Result Value Ref Range    Methamphetamine Quant, Ur <200 ng/mL    MDA, Urine <200 ng/mL    MDEA, Urine <200 ng/mL    Phentermine,Urine <200 ng/mL    Amphetamines,Urine >5000 ng/mL    MDMA, Urine <200 ng/mL   Drug Screen, Urine With Reflex to Confirmation    Collection Time: 09/23/24 12:27 PM   Result Value Ref Range    Amphetamine Screen, Urine Presumptive Positive (A) Presumptive Negative    Barbiturate Screen, Urine Presumptive Negative Presumptive Negative    Benzodiazepines Screen, Urine Presumptive Positive (A) Presumptive Negative    Cannabinoid Screen, Urine Presumptive Positive (A) Presumptive Negative    Cocaine Metabolite Screen, Urine Presumptive Negative Presumptive Negative    Fentanyl Screen, Urine Presumptive Negative Presumptive Negative    Opiate Screen, Urine Presumptive Negative Presumptive Negative    Oxycodone Screen, Urine Presumptive Negative Presumptive Negative    PCP Screen, Urine Presumptive Negative Presumptive Negative    Methadone Screen, Urine Presumptive Negative Presumptive Negative     Results are as expected.         Controlled Substance Agreement:  Date of the Last Agreement: 08/2023  Reviewed Controlled Substance Agreement including but not limited to the benefits, risks, and alternatives to treatment with a Controlled Substance medication(s).    Benzodiazepines:  What is the patient's goal of therapy?  Anxiety with panic  Is this being achieved with current treatment?  Yes    CHEKO-7:  No data recorded      Activities of Daily Living:   Is your overall impression that this patient is benefiting (symptom reduction outweighs side effects) from benzodiazepine therapy? Yes     1. Physical Functioning: Better  2. Family Relationship: Better  3. Social Relationship: Better  4. Mood: Better  5. Sleep Patterns: Better  6. Overall Function: Better and Stimulants:   What is the  patient's goal of therapy?  Attention deficit disorder  Is this being achieved with current treatment? yes    Activities of Daily Living:   Is your overall impression that this patient is benefiting (symptom reduction outweighs side effects) from stimulant therapy? Yes     1. Physical Functioning: Better  2. Family Relationship: Better  3. Social Relationship: Better  4. Mood: Better  5. Sleep Patterns: Better  6. Overall Function: Better          Problem List Items Addressed This Visit       Conductive hearing loss in right ear    Dyslipidemia    Hypercholesterolemia    Xanthelasma    High coronary artery calcium score    Medication management - Primary    Relevant Orders    Drug Screen, Urine With Reflex to Confirmation             Sravan New MD

## 2025-03-12 ENCOUNTER — APPOINTMENT (OUTPATIENT)
Dept: PRIMARY CARE | Facility: CLINIC | Age: 60
End: 2025-03-12
Payer: COMMERCIAL

## 2025-03-12 VITALS — BODY MASS INDEX: 25.76 KG/M2 | HEIGHT: 62 IN | WEIGHT: 140 LBS

## 2025-03-12 DIAGNOSIS — Z79.899 MEDICATION MANAGEMENT: Primary | ICD-10-CM

## 2025-03-12 DIAGNOSIS — H90.11 CONDUCTIVE HEARING LOSS OF RIGHT EAR WITH UNRESTRICTED HEARING OF LEFT EAR: ICD-10-CM

## 2025-03-12 DIAGNOSIS — F41.9 ANXIETY: ICD-10-CM

## 2025-03-12 DIAGNOSIS — H02.60 XANTHELASMA: ICD-10-CM

## 2025-03-12 DIAGNOSIS — E78.00 HYPERCHOLESTEROLEMIA: ICD-10-CM

## 2025-03-12 DIAGNOSIS — R93.1 HIGH CORONARY ARTERY CALCIUM SCORE: ICD-10-CM

## 2025-03-12 DIAGNOSIS — E78.5 DYSLIPIDEMIA: ICD-10-CM

## 2025-03-12 PROCEDURE — 99214 OFFICE O/P EST MOD 30 MIN: CPT | Performed by: INTERNAL MEDICINE

## 2025-03-12 PROCEDURE — 3008F BODY MASS INDEX DOCD: CPT | Performed by: INTERNAL MEDICINE

## 2025-03-12 RX ORDER — ALPRAZOLAM 0.5 MG/1
0.5 TABLET ORAL 2 TIMES DAILY
Qty: 60 TABLET | Refills: 0 | Status: SHIPPED | OUTPATIENT
Start: 2025-03-12

## 2025-03-12 ASSESSMENT — ENCOUNTER SYMPTOMS
OCCASIONAL FEELINGS OF UNSTEADINESS: 0
LOSS OF SENSATION IN FEET: 0
DEPRESSION: 0

## 2025-03-12 ASSESSMENT — PAIN SCALES - GENERAL: PAINLEVEL_OUTOF10: 0-NO PAIN

## 2025-03-14 LAB
1OH-MIDAZOLAM UR-MCNC: NEGATIVE NG/ML
7AMINOCLONAZEPAM UR-MCNC: NEGATIVE NG/ML
A-OH ALPRAZ UR-MCNC: 370 NG/ML
A-OH-TRIAZOLAM UR-MCNC: NEGATIVE NG/ML
AMPHET UR-MCNC: 7273 NG/ML
AMPHETAMINES UR QL: POSITIVE NG/ML
BARBITURATES UR QL: NEGATIVE NG/ML
BENZODIAZ UR QL: POSITIVE NG/ML
BZE UR QL: NEGATIVE NG/ML
CREAT UR-MCNC: 115.2 MG/DL
DRUG SCREEN COMMENT UR-IMP: ABNORMAL
DRUG SCREEN COMMENT UR-IMP: ABNORMAL
LORAZEPAM UR-MCNC: NEGATIVE NG/ML
METHADONE UR QL: NEGATIVE NG/ML
METHAMPHET UR-MCNC: NEGATIVE NG/ML
NORDIAZEPAM UR-MCNC: NEGATIVE NG/ML
OH-ETHYLFLURAZ UR-MCNC: NEGATIVE NG/ML
OPIATES UR QL: NEGATIVE NG/ML
OXAZEPAM UR-MCNC: NEGATIVE NG/ML
OXIDANTS UR QL: NEGATIVE MCG/ML
OXYCODONE UR QL: NEGATIVE NG/ML
PCP UR QL: NEGATIVE NG/ML
PH UR: 5.9 [PH] (ref 4.5–9)
QUEST NOTES AND COMMENTS: ABNORMAL
TEMAZEPAM UR-MCNC: NEGATIVE NG/ML
THC UR QL: NEGATIVE NG/ML

## 2025-03-24 DIAGNOSIS — F90.0 ATTENTION DEFICIT HYPERACTIVITY DISORDER (ADHD), PREDOMINANTLY INATTENTIVE TYPE: ICD-10-CM

## 2025-03-24 RX ORDER — LISDEXAMFETAMINE DIMESYLATE 30 MG/1
30 CAPSULE ORAL DAILY
Qty: 30 CAPSULE | Refills: 0 | Status: SHIPPED | OUTPATIENT
Start: 2025-03-24

## 2025-04-08 DIAGNOSIS — F41.9 ANXIETY: ICD-10-CM

## 2025-04-08 RX ORDER — ALPRAZOLAM 0.5 MG/1
0.5 TABLET ORAL 2 TIMES DAILY
Qty: 60 TABLET | Refills: 0 | Status: SHIPPED | OUTPATIENT
Start: 2025-04-08

## 2025-04-22 DIAGNOSIS — F90.0 ATTENTION DEFICIT HYPERACTIVITY DISORDER (ADHD), PREDOMINANTLY INATTENTIVE TYPE: ICD-10-CM

## 2025-04-22 RX ORDER — LISDEXAMFETAMINE DIMESYLATE 30 MG/1
30 CAPSULE ORAL DAILY
Qty: 30 CAPSULE | Refills: 0 | Status: SHIPPED | OUTPATIENT
Start: 2025-04-22

## 2025-05-08 ENCOUNTER — OFFICE VISIT (OUTPATIENT)
Dept: VASCULAR SURGERY | Facility: CLINIC | Age: 60
End: 2025-05-08
Payer: COMMERCIAL

## 2025-05-08 VITALS
BODY MASS INDEX: 26.54 KG/M2 | HEART RATE: 85 BPM | WEIGHT: 144.2 LBS | DIASTOLIC BLOOD PRESSURE: 82 MMHG | HEIGHT: 62 IN | SYSTOLIC BLOOD PRESSURE: 123 MMHG | OXYGEN SATURATION: 100 %

## 2025-05-08 DIAGNOSIS — R10.12 LEFT UPPER QUADRANT ABDOMINAL PAIN: Primary | ICD-10-CM

## 2025-05-08 DIAGNOSIS — I70.90 ATHEROSCLEROSIS OF ARTERIES: ICD-10-CM

## 2025-05-08 PROCEDURE — 99213 OFFICE O/P EST LOW 20 MIN: CPT | Performed by: SURGERY

## 2025-05-08 PROCEDURE — 1036F TOBACCO NON-USER: CPT | Performed by: SURGERY

## 2025-05-08 PROCEDURE — 99203 OFFICE O/P NEW LOW 30 MIN: CPT | Performed by: SURGERY

## 2025-05-08 PROCEDURE — 3008F BODY MASS INDEX DOCD: CPT | Performed by: SURGERY

## 2025-05-08 ASSESSMENT — PAIN SCALES - GENERAL: PAINLEVEL_OUTOF10: 0-NO PAIN

## 2025-05-08 ASSESSMENT — ENCOUNTER SYMPTOMS
LOSS OF SENSATION IN FEET: 0
DEPRESSION: 0
OCCASIONAL FEELINGS OF UNSTEADINESS: 0

## 2025-05-08 NOTE — PATIENT INSTRUCTIONS
It was a pleasure taking care of you today and appreciate your seeing us at our [facility].     Today's plan is as follows:  1) there is no concerning arterial findings on my review of your CT scan - all your main blood vessels are open and would only continue medical therapy for these incidental changes  2) please follow up with your primary doctor  3) call as needed      Please call the office with any questions at [phone].   You can speak to our secretaries or our clinical nurses for specific questions.   For [facility] specific questions, you can also call [phone] or email at [email or web address]  If you need coordinating your appointments and testing you can do these at the  or by calling my office shortly after your visit.

## 2025-05-08 NOTE — PROGRESS NOTES
NPV REASON: aortic atherosclerosis; abdominal pain  Referring Provider: Dr. Sandoval    CURRENT ENCOUNTER:  Donna Emery is 60 y.o. female here for NPV for aortic atherosclerosis; abdominal pain.  Had abdominal distension and pain in LUQ  Had scopes - biopsy - has a nagging LUQ pain  Usually after eating or laying down on that side  Since September 2024  No prior history  Also some chest pain with 2 ER visits last year.   Lower pelvic pain and hip pain.   Pain is also there even without eating  Is usually very active - this pain has really changed her QOL  No claudication  No rest pain  No post prandial pain      PastMedHX:  Medical History[1]    Meds:   Current Medications[2]    Allergies:   RX Allergies[3]    ROS:  Review of Systems     Objective:  Vitals:  Vitals:    05/08/25 1408   BP: 123/82   Pulse:    SpO2:         Exam:  No distress  Breathing comfortably   Not tachycardic  Palpable bilateral radial pulses  Abd soft, nt, nd, upper mostly left sided upper abd discomfort with deep palpation; guarding; no peritonitis  Bilateral legs with intact pulses   Bilateral perfused feet  No leg edema      Labs:  Lab Results   Component Value Date    WBC 4.2 (L) 09/19/2024    WBC 4.6 02/29/2024    WBC 7.3 02/24/2023    HGB 13.1 09/19/2024    HGB 12.8 02/29/2024    HGB 13.5 02/24/2023    HCT 40.2 09/19/2024    HCT 39.8 02/29/2024    HCT 40.9 02/24/2023    MCV 87 09/19/2024    MCV 87 02/29/2024    MCV 85 02/24/2023     09/19/2024     Lab Results   Component Value Date    CREATININE 0.67 09/19/2024    CREATININE 0.61 02/29/2024    CREATININE 0.62 12/01/2023    BUN 13 09/19/2024    BUN 9 02/29/2024    BUN 12 12/01/2023     09/19/2024     02/29/2024     12/01/2023    K 3.8 09/19/2024    K 4.0 02/29/2024    K 4.9 12/01/2023     09/19/2024     02/29/2024     12/01/2023    CO2 26 09/19/2024    CO2 30 02/29/2024    CO2 26 12/01/2023         Imaging:  CT a/p from 2/2025  reviewed    Assessment & Plan:  Donna Emery is 60 y.o. female with LUQ abdominal pain of unclear origin  No concerns for chronic mesenteric ischemia  On medical therapy    1) there is no concerning arterial findings on my review of your CT scan - all your main blood vessels are open and would only continue medical therapy for these incidental changes  2) please follow up with your primary doctor  3) call as needed      Caleb Jo MD, MHS, RPVI  , [employer]  Director, [facility], [facility] [place]  Co-Director, Vascular Laboratories, [facility] [place]  Division of Vascular Surgery and Endovascular Therapy  [facility]                 [1]   Past Medical History:  Diagnosis Date    Chronic myringitis, right ear 01/23/2017    Chronic myringitis of right ear    Other enthesopathy of unspecified foot and ankle 10/16/2015    Foot tendinitis    Otitis media, unspecified, right ear 12/08/2017    Acute right otitis media    Otitis media, unspecified, right ear 03/14/2017    Right acute otitis media    Pain in left leg 10/27/2015    Lower extremity pain, posterior, left    Pain in unspecified ankle and joints of unspecified foot 05/23/2016    Ankle pain    Personal history of other diseases of the nervous system and sense organs     History of perforation of tympanic membrane    Personal history of other diseases of the respiratory system 11/02/2017    History of acute sinusitis    Personal history of other diseases of the respiratory system 12/08/2017    History of acute bronchitis with bronchospasm    Personal history of other diseases of the respiratory system 03/14/2017    History of acute bronchitis with bronchospasm    Personal history of other diseases of the respiratory system 01/20/2020    History of acute bacterial sinusitis    Personal history of  other diseases of the respiratory system 08/10/2015    History of bronchitis    Personal history of other diseases of the respiratory system 08/10/2015    History of sinusitis    Personal history of other specified conditions 07/27/2016    History of headache    Personal history of other specified conditions 03/30/2016    History of diarrhea    Unspecified acute conjunctivitis, right eye 11/02/2017    Acute conjunctivitis of right eye, unspecified acute conjunctivitis type    Unspecified chronic otitis externa, unspecified ear 12/04/2015    Chronic otitis externa   [2]   Current Outpatient Medications:     ALPRAZolam (Xanax) 0.5 mg tablet, Take 1 tablet (0.5 mg) by mouth 2 times a day., Disp: 60 tablet, Rfl: 0    cholecalciferol (Vitamin D-3) 50,000 unit capsule, Take 1 capsule (50,000 Units) by mouth 1 (one) time per week., Disp: 4 capsule, Rfl: 3    estradiol (Vagifem) 10 mcg tablet vaginal tablet, Insert 1 tablet (10 mcg) into the vagina., Disp: , Rfl:     estrogens, conjugated, (Premarin) vaginal cream, 0.5 gm Vaginal twice weekly for 90 days, Disp: , Rfl:     evolocumab (Repatha SureClick) 140 mg/mL injection, Inject 1 mL (140 mg) under the skin every 14 (fourteen) days., Disp: , Rfl:     evolocumab (Repatha SureClick) 140 mg/mL injection, Inject 1 mL (140 mg) under the skin every 14 (fourteen) days., Disp: 6 mL, Rfl: 3    lisdexamfetamine (Vyvanse) 30 mg capsule, Take 1 capsule (30 mg) by mouth once daily., Disp: 30 capsule, Rfl: 0    nitrofurantoin, macrocrystal-monohydrate, (Macrobid) 100 mg capsule, Take 1 capsule (100 mg) by mouth every 12 hours., Disp: 30 capsule, Rfl: 1    hyoscyamine (Levsin/SL) 0.125 mg disintegrating tablet, Take 1 tablet (0.125 mg) by mouth every 4 hours if needed (abdominal pain spasm). (Patient not taking: Reported on 5/8/2025), Disp: 60 each, Rfl: 1  [3]   Allergies  Allergen Reactions    Sulfa (Sulfonamide Antibiotics) Anaphylaxis    Sulfamethoxazole-Trimethoprim Unknown     Sulfone Unknown

## 2025-05-09 DIAGNOSIS — F41.9 ANXIETY: ICD-10-CM

## 2025-05-09 RX ORDER — ALPRAZOLAM 0.5 MG/1
0.5 TABLET ORAL 2 TIMES DAILY
Qty: 60 TABLET | Refills: 0 | Status: SHIPPED | OUTPATIENT
Start: 2025-05-09

## 2025-05-27 DIAGNOSIS — F90.0 ATTENTION DEFICIT HYPERACTIVITY DISORDER (ADHD), PREDOMINANTLY INATTENTIVE TYPE: ICD-10-CM

## 2025-05-27 RX ORDER — LISDEXAMFETAMINE DIMESYLATE 30 MG/1
30 CAPSULE ORAL DAILY
Qty: 30 CAPSULE | Refills: 0 | Status: SHIPPED | OUTPATIENT
Start: 2025-05-27

## 2025-06-04 DIAGNOSIS — F41.9 ANXIETY: ICD-10-CM

## 2025-06-05 RX ORDER — ALPRAZOLAM 0.5 MG/1
0.5 TABLET ORAL 2 TIMES DAILY
Qty: 60 TABLET | Refills: 0 | Status: SHIPPED | OUTPATIENT
Start: 2025-06-05

## 2025-06-11 DIAGNOSIS — M81.0 OSTEOPOROSIS, UNSPECIFIED OSTEOPOROSIS TYPE, UNSPECIFIED PATHOLOGICAL FRACTURE PRESENCE: Primary | ICD-10-CM

## 2025-06-23 DIAGNOSIS — F90.0 ATTENTION DEFICIT HYPERACTIVITY DISORDER (ADHD), PREDOMINANTLY INATTENTIVE TYPE: ICD-10-CM

## 2025-06-23 RX ORDER — LISDEXAMFETAMINE DIMESYLATE 30 MG/1
30 CAPSULE ORAL DAILY
Qty: 30 CAPSULE | Refills: 0 | Status: SHIPPED | OUTPATIENT
Start: 2025-06-23

## 2025-06-30 ENCOUNTER — APPOINTMENT (OUTPATIENT)
Dept: RADIOLOGY | Facility: CLINIC | Age: 60
End: 2025-06-30
Payer: COMMERCIAL

## 2025-06-30 DIAGNOSIS — M81.0 OSTEOPOROSIS, UNSPECIFIED OSTEOPOROSIS TYPE, UNSPECIFIED PATHOLOGICAL FRACTURE PRESENCE: ICD-10-CM

## 2025-06-30 PROCEDURE — 77080 DXA BONE DENSITY AXIAL: CPT | Performed by: RADIOLOGY

## 2025-06-30 PROCEDURE — 77080 DXA BONE DENSITY AXIAL: CPT

## 2025-07-01 ENCOUNTER — APPOINTMENT (OUTPATIENT)
Dept: RADIOLOGY | Facility: CLINIC | Age: 60
End: 2025-07-01
Payer: COMMERCIAL

## 2025-07-01 DIAGNOSIS — M81.0 OSTEOPOROSIS, UNSPECIFIED OSTEOPOROSIS TYPE, UNSPECIFIED PATHOLOGICAL FRACTURE PRESENCE: Primary | ICD-10-CM

## 2025-07-10 ENCOUNTER — OFFICE VISIT (OUTPATIENT)
Dept: PRIMARY CARE | Facility: CLINIC | Age: 60
End: 2025-07-10
Payer: COMMERCIAL

## 2025-07-10 VITALS
BODY MASS INDEX: 26.68 KG/M2 | DIASTOLIC BLOOD PRESSURE: 80 MMHG | TEMPERATURE: 97.5 F | WEIGHT: 145 LBS | HEIGHT: 62 IN | SYSTOLIC BLOOD PRESSURE: 130 MMHG

## 2025-07-10 DIAGNOSIS — M25.541 ARTHRALGIA OF BOTH HANDS: Primary | ICD-10-CM

## 2025-07-10 DIAGNOSIS — M25.542 ARTHRALGIA OF BOTH HANDS: Primary | ICD-10-CM

## 2025-07-10 PROCEDURE — 3008F BODY MASS INDEX DOCD: CPT | Performed by: PHYSICIAN ASSISTANT

## 2025-07-10 PROCEDURE — 99214 OFFICE O/P EST MOD 30 MIN: CPT | Performed by: PHYSICIAN ASSISTANT

## 2025-07-10 RX ORDER — ERGOCALCIFEROL 1.25 MG/1
1 CAPSULE ORAL
COMMUNITY

## 2025-07-10 RX ORDER — METHYLPREDNISOLONE 4 MG/1
TABLET ORAL
Qty: 21 TABLET | Refills: 0 | Status: SHIPPED | OUTPATIENT
Start: 2025-07-10 | End: 2025-07-16

## 2025-07-10 ASSESSMENT — ENCOUNTER SYMPTOMS
EYE DISCHARGE: 0
CHEST TIGHTNESS: 0
EYE PAIN: 0
MYALGIAS: 0
ABDOMINAL DISTENTION: 0
NAUSEA: 0
APPETITE CHANGE: 0
SHORTNESS OF BREATH: 0
CHOKING: 0
CONSTIPATION: 0
VOMITING: 0
CHILLS: 0
DIFFICULTY URINATING: 0
SLEEP DISTURBANCE: 0
JOINT SWELLING: 0
WHEEZING: 0
PAIN: 1
ABDOMINAL PAIN: 0
DIARRHEA: 0
PALPITATIONS: 0
DIZZINESS: 0
CONFUSION: 0
POLYPHAGIA: 0
FACIAL SWELLING: 0
COLOR CHANGE: 0
FREQUENCY: 0
WEAKNESS: 0
TREMORS: 0
ANAL BLEEDING: 0
SORE THROAT: 0
HEADACHES: 0
FEVER: 0
NUMBNESS: 0
HEMATURIA: 0
NERVOUS/ANXIOUS: 0
POLYDIPSIA: 0
FATIGUE: 0
COUGH: 0

## 2025-07-10 ASSESSMENT — PAIN SCALES - GENERAL: PAINLEVEL_OUTOF10: 4

## 2025-07-10 NOTE — PROGRESS NOTES
Subjective   Patient ID: Donna Emery is a 60 y.o. female with a history of ADD, IBS, low B12, familial hyperlipidemia who presents for Pain (Back/Hips/Joints).    Pain  Pertinent negatives include no abdominal pain, chest pain, constipation, diarrhea, eye pain, fatigue, fever, headaches, joint swelling, nausea, rash, shortness of breath, vomiting, weakness or wheezing.    the patient is complaining of body aches including fingers, hips, ankles and toes which she developed 4 days ago. She walks holding furniture. She took Advil 400 mg this morning and last night which took the edge off.  She denies swelling, redness or warmth except of a small lump on her right distal fifth digit.  Stated that she had pains in the past but this is the worst pain she has ever had.  She could barely work.  She has upcoming appointment with rheumatology in 1 month.  She denies any injuries or trauma.    Review of Systems   Constitutional:  Negative for appetite change, chills, fatigue and fever.   HENT:  Negative for congestion, ear pain, facial swelling, hearing loss, nosebleeds and sore throat.    Eyes:  Negative for pain, discharge and visual disturbance.   Respiratory:  Negative for cough, choking, chest tightness, shortness of breath and wheezing.    Cardiovascular:  Negative for chest pain, palpitations and leg swelling.   Gastrointestinal:  Negative for abdominal distention, abdominal pain, anal bleeding, constipation, diarrhea, nausea and vomiting.   Endocrine: Negative for cold intolerance, heat intolerance, polydipsia, polyphagia and polyuria.   Genitourinary:  Negative for difficulty urinating, frequency, hematuria and urgency.   Musculoskeletal:  Positive for arthralgias. Negative for gait problem, joint swelling and myalgias.   Skin:  Negative for color change and rash.   Neurological:  Negative for dizziness, tremors, syncope, weakness, numbness and headaches.   Psychiatric/Behavioral:  Negative for behavioral  "problems, confusion, sleep disturbance and suicidal ideas. The patient is not nervous/anxious.        Objective   /80   Temp 36.4 °C (97.5 °F) (Temporal)   Ht 1.575 m (5' 2\")   Wt 65.8 kg (145 lb)   Breastfeeding No   BMI 26.52 kg/m²     Physical Exam  Constitutional:       General: She is not in acute distress.     Appearance: Normal appearance.   HENT:      Head: Normocephalic and atraumatic.      Nose: Nose normal.   Eyes:      Extraocular Movements: Extraocular movements intact.      Conjunctiva/sclera: Conjunctivae normal.      Pupils: Pupils are equal, round, and reactive to light.   Cardiovascular:      Rate and Rhythm: Normal rate and regular rhythm.      Pulses: Normal pulses.      Heart sounds: Normal heart sounds.   Pulmonary:      Effort: Pulmonary effort is normal.      Breath sounds: Normal breath sounds.   Abdominal:      General: Bowel sounds are normal.      Palpations: Abdomen is soft.   Musculoskeletal:         General: Normal range of motion.      Cervical back: Normal range of motion and neck supple.   Neurological:      General: No focal deficit present.      Mental Status: She is alert and oriented to person, place, and time.   Psychiatric:         Mood and Affect: Mood normal.         Behavior: Behavior normal.         Thought Content: Thought content normal.         Judgment: Judgment normal.         Assessment/Plan     Arthralgia of multiple joints  Family history of rheumatoid arthritis on mother side  We obtained arthritis panel today including JOSE MIGUEL, CRP, sed rate and rheumatoid factor  Take Medrol Dosepak to reduce inflammation and pains  Take Tylenol Extra Strength 3 times a day as needed for pain  Rheumatology scheduled on 9/12/2025    I will call with results    Follow-up with PCP       "

## 2025-07-11 ENCOUNTER — TELEPHONE (OUTPATIENT)
Dept: PRIMARY CARE | Facility: CLINIC | Age: 60
End: 2025-07-11
Payer: COMMERCIAL

## 2025-07-11 DIAGNOSIS — F41.9 ANXIETY: ICD-10-CM

## 2025-07-11 PROBLEM — M25.542 ARTHRALGIA OF BOTH HANDS: Status: ACTIVE | Noted: 2025-07-11

## 2025-07-11 PROBLEM — M25.541 ARTHRALGIA OF BOTH HANDS: Status: ACTIVE | Noted: 2025-07-11

## 2025-07-11 LAB
ANA SER QL IF: NEGATIVE
CRP SERPL-MCNC: <3 MG/L
ERYTHROCYTE [SEDIMENTATION RATE] IN BLOOD BY WESTERGREN METHOD: 29 MM/H
RHEUMATOID FACT SERPL-ACNC: <10 IU/ML
URATE SERPL-MCNC: 2.9 MG/DL (ref 2.5–7)

## 2025-07-11 RX ORDER — ALPRAZOLAM 0.5 MG/1
0.5 TABLET ORAL 2 TIMES DAILY
Qty: 60 TABLET | Refills: 0 | Status: SHIPPED | OUTPATIENT
Start: 2025-07-11

## 2025-07-11 ASSESSMENT — ENCOUNTER SYMPTOMS: ARTHRALGIAS: 1

## 2025-07-11 NOTE — TELEPHONE ENCOUNTER
Patient is requesting a refill of Xanax 0.5mg to be sent to [facility] pharmacy on file with a 30 day supply.

## 2025-07-11 NOTE — TELEPHONE ENCOUNTER
----- Message from Sariah Sandoval sent at 7/11/2025  4:59 PM EDT -----  Please call her with the results.  She is negative for arthritis panel.  There is no rheumatoid arthritis.  ----- Message -----  From: Sebastian Rogate Results In  Sent: 7/11/2025   8:20 AM EDT  To: Sariah Sandoval PA-C

## 2025-07-25 DIAGNOSIS — F90.0 ATTENTION DEFICIT HYPERACTIVITY DISORDER (ADHD), PREDOMINANTLY INATTENTIVE TYPE: ICD-10-CM

## 2025-07-25 RX ORDER — LISDEXAMFETAMINE DIMESYLATE 30 MG/1
30 CAPSULE ORAL DAILY
Qty: 30 CAPSULE | Refills: 0 | Status: SHIPPED | OUTPATIENT
Start: 2025-07-25

## 2025-07-27 DIAGNOSIS — E78.5 DYSLIPIDEMIA: ICD-10-CM

## 2025-07-27 DIAGNOSIS — T46.6X5A STATIN-INDUCED MYOSITIS: ICD-10-CM

## 2025-07-27 DIAGNOSIS — M60.9 STATIN-INDUCED MYOSITIS: ICD-10-CM

## 2025-07-30 RX ORDER — EVOLOCUMAB 140 MG/ML
INJECTION, SOLUTION SUBCUTANEOUS
Qty: 6 ML | Refills: 0 | Status: SHIPPED | OUTPATIENT
Start: 2025-07-30

## 2025-08-08 DIAGNOSIS — F41.9 ANXIETY: ICD-10-CM

## 2025-08-08 RX ORDER — ALPRAZOLAM 0.5 MG/1
0.5 TABLET ORAL 2 TIMES DAILY
Qty: 60 TABLET | Refills: 0 | Status: SHIPPED | OUTPATIENT
Start: 2025-08-08

## 2025-08-11 DIAGNOSIS — E78.5 DYSLIPIDEMIA: ICD-10-CM

## 2025-08-11 DIAGNOSIS — M60.9 STATIN-INDUCED MYOSITIS: ICD-10-CM

## 2025-08-11 DIAGNOSIS — T46.6X5A STATIN-INDUCED MYOSITIS: ICD-10-CM

## 2025-08-11 RX ORDER — EVOLOCUMAB 140 MG/ML
140 INJECTION, SOLUTION SUBCUTANEOUS
Qty: 6 ML | Refills: 2 | Status: SHIPPED | OUTPATIENT
Start: 2025-08-11

## 2025-08-26 DIAGNOSIS — F90.0 ATTENTION DEFICIT HYPERACTIVITY DISORDER (ADHD), PREDOMINANTLY INATTENTIVE TYPE: ICD-10-CM

## 2025-08-26 RX ORDER — LISDEXAMFETAMINE DIMESYLATE 30 MG/1
30 CAPSULE ORAL DAILY
Qty: 30 CAPSULE | Refills: 0 | Status: SHIPPED | OUTPATIENT
Start: 2025-08-26

## 2025-08-31 ASSESSMENT — RHEUMATOLOGY NEW PATIENT QUESTIONNAIRE
SWOLLEN LEGS OR FEET: Y
JOINT PAIN: Y
DOUBLE OR BLURRED VISION: Y
MUSCLE WEAKNESS: Y
MORNING STIFFNESS: Y
NUMBNESS OR TINGLING IN HANDS OR FEET: Y
HOW WOULD YOU DESCRIBE YOUR STIFFNESS ON AVERAGE: MODERATE
UNUSUAL FATIGUE: Y
MORNING STIFFNESS IN LOWER BACK: Y

## 2025-09-03 ENCOUNTER — HOSPITAL ENCOUNTER (OUTPATIENT)
Dept: RADIOLOGY | Facility: CLINIC | Age: 60
Discharge: HOME | End: 2025-09-03
Payer: COMMERCIAL

## 2025-09-03 ENCOUNTER — APPOINTMENT (OUTPATIENT)
Dept: RHEUMATOLOGY | Facility: CLINIC | Age: 60
End: 2025-09-03
Payer: COMMERCIAL

## 2025-09-03 DIAGNOSIS — M25.551 RIGHT HIP PAIN: ICD-10-CM

## 2025-09-03 PROBLEM — M19.041 PRIMARY OSTEOARTHRITIS OF BOTH HANDS: Status: ACTIVE | Noted: 2025-09-03

## 2025-09-03 PROBLEM — M19.042 PRIMARY OSTEOARTHRITIS OF BOTH HANDS: Status: ACTIVE | Noted: 2025-09-03

## 2025-09-03 PROCEDURE — 73502 X-RAY EXAM HIP UNI 2-3 VIEWS: CPT | Mod: RIGHT SIDE | Performed by: RADIOLOGY

## 2025-09-03 PROCEDURE — 73502 X-RAY EXAM HIP UNI 2-3 VIEWS: CPT | Mod: RT

## 2025-09-03 ASSESSMENT — ENCOUNTER SYMPTOMS
DEPRESSION: 0
LOSS OF SENSATION IN FEET: 0
OCCASIONAL FEELINGS OF UNSTEADINESS: 0

## 2025-09-03 ASSESSMENT — PAIN SCALES - GENERAL: PAINLEVEL_OUTOF10: 6

## 2025-09-04 DIAGNOSIS — F41.9 ANXIETY: ICD-10-CM

## 2025-09-04 RX ORDER — ALPRAZOLAM 0.5 MG/1
0.5 TABLET ORAL 2 TIMES DAILY
Qty: 60 TABLET | Refills: 0 | Status: SHIPPED | OUTPATIENT
Start: 2025-09-04

## 2025-09-12 ENCOUNTER — APPOINTMENT (OUTPATIENT)
Dept: RHEUMATOLOGY | Facility: CLINIC | Age: 60
End: 2025-09-12
Payer: COMMERCIAL

## 2025-09-18 ENCOUNTER — APPOINTMENT (OUTPATIENT)
Dept: PRIMARY CARE | Facility: CLINIC | Age: 60
End: 2025-09-18
Payer: COMMERCIAL

## 2025-10-10 ENCOUNTER — APPOINTMENT (OUTPATIENT)
Dept: RHEUMATOLOGY | Facility: CLINIC | Age: 60
End: 2025-10-10
Payer: COMMERCIAL